# Patient Record
Sex: FEMALE | Race: WHITE | NOT HISPANIC OR LATINO | Employment: FULL TIME | URBAN - METROPOLITAN AREA
[De-identification: names, ages, dates, MRNs, and addresses within clinical notes are randomized per-mention and may not be internally consistent; named-entity substitution may affect disease eponyms.]

---

## 2023-10-03 ENCOUNTER — LAB (OUTPATIENT)
Dept: LAB | Facility: LAB | Age: 43
End: 2023-10-03
Payer: COMMERCIAL

## 2023-10-03 DIAGNOSIS — Z00.00 ENCOUNTER FOR GENERAL ADULT MEDICAL EXAMINATION WITHOUT ABNORMAL FINDINGS: Primary | ICD-10-CM

## 2023-10-03 LAB
AMPHETAMINES UR QL SCN: NORMAL
BARBITURATES UR QL SCN: NORMAL
BENZODIAZ UR QL SCN: NORMAL
BZE UR QL SCN: NORMAL
CANNABINOIDS UR QL SCN: NORMAL
FENTANYL+NORFENTANYL UR QL SCN: NORMAL
OPIATES UR QL SCN: NORMAL
OXYCODONE+OXYMORPHONE UR QL SCN: NORMAL
PCP UR QL SCN: NORMAL

## 2023-10-06 LAB
1OH-MIDAZOLAM UR CFM-MCNC: <25 NG/ML
7AMINOCLONAZEPAM UR CFM-MCNC: 170 NG/ML
A-OH ALPRAZ UR CFM-MCNC: <25 NG/ML
ALPRAZ UR CFM-MCNC: <25 NG/ML
CHLORDIAZEP UR CFM-MCNC: <25 NG/ML
CLONAZEPAM UR CFM-MCNC: <25 NG/ML
DIAZEPAM UR CFM-MCNC: <25 NG/ML
LORAZEPAM UR CFM-MCNC: <25 NG/ML
MIDAZOLAM UR CFM-MCNC: <25 NG/ML
NORDIAZEPAM UR CFM-MCNC: <25 NG/ML
OXAZEPAM UR CFM-MCNC: <25 NG/ML
TEMAZEPAM UR CFM-MCNC: <25 NG/ML

## 2023-10-11 DIAGNOSIS — F90.0 ATTENTION DEFICIT HYPERACTIVITY DISORDER (ADHD), PREDOMINANTLY INATTENTIVE TYPE: ICD-10-CM

## 2023-10-11 RX ORDER — LISDEXAMFETAMINE DIMESYLATE 30 MG/1
30 CAPSULE ORAL EVERY MORNING
Qty: 30 CAPSULE | Refills: 0 | Status: SHIPPED | OUTPATIENT
Start: 2023-10-11 | End: 2023-11-15 | Stop reason: SDUPTHER

## 2023-10-15 DIAGNOSIS — F41.0 PANIC DISORDER: ICD-10-CM

## 2023-10-16 RX ORDER — CLONAZEPAM 0.5 MG/1
0.5 TABLET, ORALLY DISINTEGRATING ORAL 2 TIMES DAILY PRN
COMMUNITY
Start: 2023-09-28 | End: 2023-10-16 | Stop reason: SDUPTHER

## 2023-10-16 RX ORDER — ARIPIPRAZOLE 2 MG/1
2 TABLET ORAL DAILY
COMMUNITY
Start: 2023-07-24 | End: 2023-12-13 | Stop reason: SDUPTHER

## 2023-10-16 RX ORDER — CITALOPRAM 40 MG/1
40 TABLET, FILM COATED ORAL DAILY
COMMUNITY
End: 2023-12-13 | Stop reason: SDUPTHER

## 2023-10-16 RX ORDER — CLONAZEPAM 0.5 MG/1
0.5 TABLET, ORALLY DISINTEGRATING ORAL 2 TIMES DAILY PRN
Qty: 60 TABLET | Refills: 0 | Status: SHIPPED | OUTPATIENT
Start: 2023-10-16 | End: 2023-10-19 | Stop reason: ALTCHOICE

## 2023-10-16 NOTE — PROGRESS NOTES
Patient emailed requesting refill for clonazepam. She reports her last refill was only partially filled d/t lack of supply at pharmacy - requests new prescription be sent to   [facility]   [id number] [id number]    Unable to review OARRS in EPIC  Reviewed via outside system on 10/16/234  Clonazepam 0.5mg last filled on 9/28/23 for 25 tablets/12days.  30d refill placed (60 tablets)

## 2023-10-19 ENCOUNTER — TELEPHONE (OUTPATIENT)
Dept: BEHAVIORAL HEALTH | Facility: CLINIC | Age: 43
End: 2023-10-19
Payer: COMMERCIAL

## 2023-10-19 DIAGNOSIS — F41.0 PANIC DISORDER: ICD-10-CM

## 2023-10-19 RX ORDER — CLONAZEPAM 0.5 MG/1
0.5 TABLET ORAL 2 TIMES DAILY
Qty: 60 TABLET | Refills: 0 | Status: SHIPPED | OUTPATIENT
Start: 2023-10-19 | End: 2023-10-20 | Stop reason: ALTCHOICE

## 2023-10-19 NOTE — PROGRESS NOTES
Received message from patient that her requested pharmacy is out of Clonazepam disintegrating tablets - she is requesting a one time fill of oral tablets. Reviewed OARRS on 10/19/23 via [employer]. Unable to access PDMP in [facility]. Clonazepam last filled on 9/28/23 for 25 tablets (0.5mg) New order placed per patient request.

## 2023-10-20 RX ORDER — CLONAZEPAM 0.5 MG/1
0.5 TABLET ORAL 2 TIMES DAILY
Qty: 60 TABLET | Refills: 0 | Status: SHIPPED | OUTPATIENT
Start: 2023-10-20 | End: 2023-12-13 | Stop reason: DRUGHIGH

## 2023-10-20 NOTE — PROGRESS NOTES
Patient request new prescription of Clonazepam 0.5mg tablet be sent to [facility] at [id number] [place] [address] [facility] in [place]. Reviewed OARRS on 10/20/23. Clonazepam last filled on 9/28/23 for 12day supply. New order entered as requested and cancelled all unfilled orders.

## 2023-11-15 ENCOUNTER — TELEPHONE (OUTPATIENT)
Dept: BEHAVIORAL HEALTH | Facility: CLINIC | Age: 43
End: 2023-11-15
Payer: COMMERCIAL

## 2023-11-15 DIAGNOSIS — F90.0 ATTENTION DEFICIT HYPERACTIVITY DISORDER (ADHD), PREDOMINANTLY INATTENTIVE TYPE: ICD-10-CM

## 2023-11-15 RX ORDER — LISDEXAMFETAMINE DIMESYLATE 30 MG/1
30 CAPSULE ORAL EVERY MORNING
Qty: 30 CAPSULE | Refills: 0 | Status: SHIPPED | OUTPATIENT
Start: 2023-11-15 | End: 2023-12-13 | Stop reason: DRUGHIGH

## 2023-11-15 NOTE — PROGRESS NOTES
Refill requested for Vyvanse 30mg daily. OARRS reviewed on 11/15/23 - no discrepancies or concerns noted. Vyvanse last filled on 10/12/23. 30d refill placed.    None

## 2023-12-13 ENCOUNTER — TELEMEDICINE (OUTPATIENT)
Dept: BEHAVIORAL HEALTH | Facility: CLINIC | Age: 43
End: 2023-12-13
Payer: COMMERCIAL

## 2023-12-13 DIAGNOSIS — F33.40 RECURRENT MAJOR DEPRESSIVE DISORDER, IN REMISSION (CMS-HCC): ICD-10-CM

## 2023-12-13 DIAGNOSIS — F41.1 GAD (GENERALIZED ANXIETY DISORDER): ICD-10-CM

## 2023-12-13 DIAGNOSIS — F41.0 PANIC DISORDER: ICD-10-CM

## 2023-12-13 DIAGNOSIS — F90.0 ADHD, PREDOMINANTLY INATTENTIVE TYPE: ICD-10-CM

## 2023-12-13 PROCEDURE — 99213 OFFICE O/P EST LOW 20 MIN: CPT

## 2023-12-13 RX ORDER — LISDEXAMFETAMINE DIMESYLATE 40 MG/1
40 CAPSULE ORAL EVERY MORNING
Qty: 30 CAPSULE | Refills: 0 | Status: SHIPPED | OUTPATIENT
Start: 2024-01-11 | End: 2024-01-26 | Stop reason: SDUPTHER

## 2023-12-13 RX ORDER — HYDROXYZINE HYDROCHLORIDE 50 MG/1
50 TABLET, FILM COATED ORAL NIGHTLY
COMMUNITY
Start: 2023-09-27 | End: 2023-12-13 | Stop reason: SDUPTHER

## 2023-12-13 RX ORDER — LISDEXAMFETAMINE DIMESYLATE 40 MG/1
40 CAPSULE ORAL EVERY MORNING
Qty: 30 CAPSULE | Refills: 0 | Status: SHIPPED | OUTPATIENT
Start: 2023-12-13 | End: 2024-01-26 | Stop reason: WASHOUT

## 2023-12-13 RX ORDER — CLONAZEPAM 0.5 MG/1
0.5 TABLET, ORALLY DISINTEGRATING ORAL 2 TIMES DAILY PRN
Qty: 60 TABLET | Refills: 0 | Status: SHIPPED | OUTPATIENT
Start: 2024-01-11 | End: 2024-02-12 | Stop reason: WASHOUT

## 2023-12-13 RX ORDER — CITALOPRAM 40 MG/1
40 TABLET, FILM COATED ORAL DAILY
Qty: 90 TABLET | Refills: 0 | Status: SHIPPED | OUTPATIENT
Start: 2023-12-13 | End: 2024-02-12 | Stop reason: SDUPTHER

## 2023-12-13 RX ORDER — ARIPIPRAZOLE 2 MG/1
2 TABLET ORAL DAILY
Qty: 90 TABLET | Refills: 0 | Status: SHIPPED | OUTPATIENT
Start: 2023-12-13 | End: 2024-02-12 | Stop reason: SDUPTHER

## 2023-12-13 RX ORDER — HYDROXYZINE HYDROCHLORIDE 50 MG/1
50 TABLET, FILM COATED ORAL NIGHTLY
Qty: 90 TABLET | Refills: 0 | Status: SHIPPED | OUTPATIENT
Start: 2023-12-13 | End: 2024-02-12 | Stop reason: SDUPTHER

## 2023-12-13 RX ORDER — CLONAZEPAM 0.5 MG/1
0.5 TABLET, ORALLY DISINTEGRATING ORAL 2 TIMES DAILY PRN
Qty: 60 TABLET | Refills: 0 | Status: SHIPPED | OUTPATIENT
Start: 2023-12-13 | End: 2024-02-12 | Stop reason: WASHOUT

## 2023-12-13 NOTE — PROGRESS NOTES
"Subjective   Patient ID: Caro Dewitt is a 43 y.o. female who presents for Anxiety, ADHD, Depression, and Med Management Last visit with this writer on 9/27/23.     HPI  When asked how she is doing patient reports \"Overwhelmed\", \"work is crazy right now\". Mood is stable - settling into life in [facility] with recent move. Patient reports difficulty starting and finishing tasks at home and work. Feeling overwhelmed in both settings. Divorce is described as \"going well\", \"doing great\". Communicating and working with Ex as needed to care for children. Increasing stress reported as patients job may be moved to in-person but no details provided yet. Working to establish with PCP after work trip in late January.     - Depression -   Mood: \"pretty good\"   No anhedonia   Appetite: stable   Weight loss or gain: stable   Sleep: \"way better\" - using PRN hydroxyzine, 7-8 hours/night   Worthlessness/hopelessness/guilt: endorses   Concentration/Focus: impaired, impacted by anxiety and ADHD   Energy levels: + fatigue   Safety: When asked directly, patient denies any SI/HI plan or intent.   + SI recognized when taking Lexapro in the past.      - anxiety -   + excessive worrying/anxiety - \"doing okay\"   Irritability - \"little bit\"  Tense - \"all the time\"   Increased anxiety when \"socializing\"   panic attacks - endorses, at baseline, most days.   + PD     ADHD  Vyvanse 30mg - inadequate dose   Describes an inability to finish tasks and stay focused   + Easily distracted at home and work  + difficultly communicating and staying focused in meetings    Patient is low acute and low chronic risk of suicide. Static risk factors include female gender,  race and age 42. Dynamic risk factors include above psychiatric symptoms which we are addressing with medication and recent life stressors and relationship difficulties. Protective factors include no previous attempts, no drug abuse, rational thinking intact, good social support, " help seeking, no SI, hopeful, future oriented and no guns at home.     Substance use hx  ETOH - 4 drinks every two months   Tobacco - smoking, 1ppd   MJ - denies   Illicit substance use - denies   hx of treatment for CYRIL - denies   caffeine - 3 cups of coffee daily     Objective   Physical Exam    Lab Review:   No visits with results within 2 Month(s) from this visit.   Latest known visit with results is:   Lab on 10/03/2023   Component Date Value    Amphetamine Screen, Urine 10/03/2023 Presumptive Negative     Barbiturate Screen, Urine 10/03/2023 Presumptive Negative     Benzodiazepines Screen, * 10/03/2023 Presumptive Negative     Cannabinoid Screen, Urine 10/03/2023 Presumptive Negative     Cocaine Metabolite Scree* 10/03/2023 Presumptive Negative     Fentanyl Screen, Urine 10/03/2023 Presumptive Negative     Opiate Screen, Urine 10/03/2023 Presumptive Negative     Oxycodone Screen, Urine 10/03/2023 Presumptive Negative     PCP Screen, Urine 10/03/2023 Presumptive Negative     Clonazepam 10/03/2023 <25     7-Aminoclonazepam 10/03/2023 170 (H)     Alprazolam 10/03/2023 <25     Alpha-Hydroxyalprazolam 10/03/2023 <25     Midazolam 10/03/2023 <25     Alpha-Hydroxymidazolam 10/03/2023 <25     Chlordiazepoxide 10/03/2023 <25     Diazepam 10/03/2023 <25     Nordiazepam 10/03/2023 <25     Temazepam 10/03/2023 <25     Oxazepam 10/03/2023 <25     Lorazepam 10/03/2023 <25        Assessment/Plan   Problem List Items Addressed This Visit             ICD-10-CM    Panic disorder F41.0    Recurrent major depressive disorder, in remission (CMS/HCC) F33.40    CHANRDAKANT (generalized anxiety disorder) F41.1    ADHD, predominantly inattentive type F90.0   43 y/o female just moved to [place], [place] from [place].  from . + 2 kids (14 and 9).   Employed full-time as a  at [employer] in [place]. [employer]  pphx: MDD, CHANDRAKANT, panic disorder and ADHD     DSM-5 Diagnosis   MDD  CHANDRAKANT  panic disorder  ADHD - inattentive type       Current Medications   hydroxyzine 50mg once daily at bedtime  Celexa 40mg daily  Vyvanse 30mg - last filled on 11/19/23   Clonazepam disintegrating 0.5mg BID - last filled on 10/23/23.   Abilify 2mg   Drug screen completed 10/3/23  No CSA on file   Reviewed OARRS on 12/13/23, no discrepancies or concerns noted      - Mood is stable with adequate sleep.   - panic and anxiety symptoms remain present - at baseline  - ADHD symptoms remain present and impactful   - increase Vyvanse dose to 40mg daily  - c/w Celexa 40mg daily  - c/w Hydroxyzine 50mg at bedtime  - c/w Abilify 2mg at bedtime   - c/w Clonazepam 0.5mg ODT BID   - CSA sent to patient via [employer]   - Encouraged patient to communicate any questions, concerns or side effects if recognized. Advised to call [phone] to report any urgent concerns and/or schedule a follow-up in two months or sooner if needed   - Visit scheduled per patient preference  - Patient is aware this provider is leaving  March 2024, recommended to continue care with  Psychiatry.   - patient is agreeable to plan detailed above.     Start time 2:29pm   End time 2:49pm   Prep/charting time 8min   Total time 28min

## 2023-12-13 NOTE — PATIENT INSTRUCTIONS
Continue Clonazepam, Celexa, Abilify and Hydroxyzine as prescribed  Increase Vyvanse dose to 40mg daily  Follow up visit in two months or sooner if needed  Please call the office or message via [employer] with any questions or concerns

## 2024-01-26 DIAGNOSIS — F90.0 ADHD, PREDOMINANTLY INATTENTIVE TYPE: ICD-10-CM

## 2024-01-26 RX ORDER — LISDEXAMFETAMINE DIMESYLATE 40 MG/1
40 CAPSULE ORAL EVERY MORNING
Qty: 30 CAPSULE | Refills: 0 | Status: SHIPPED | OUTPATIENT
Start: 2024-01-26 | End: 2024-05-07 | Stop reason: SDUPTHER

## 2024-01-26 NOTE — PROGRESS NOTES
Reviewed OARRS on 1/26/24. No Discrepancies or concerns noted. Refill for Vyvanse 40mg daily placed - 30d supply.

## 2024-02-12 ENCOUNTER — TELEMEDICINE (OUTPATIENT)
Dept: BEHAVIORAL HEALTH | Facility: CLINIC | Age: 44
End: 2024-02-12
Payer: COMMERCIAL

## 2024-02-12 DIAGNOSIS — F33.40 RECURRENT MAJOR DEPRESSIVE DISORDER, IN REMISSION (CMS-HCC): ICD-10-CM

## 2024-02-12 DIAGNOSIS — F90.0 ADHD, PREDOMINANTLY INATTENTIVE TYPE: ICD-10-CM

## 2024-02-12 DIAGNOSIS — F41.1 GAD (GENERALIZED ANXIETY DISORDER): ICD-10-CM

## 2024-02-12 DIAGNOSIS — F41.0 PANIC DISORDER: ICD-10-CM

## 2024-02-12 PROCEDURE — 99213 OFFICE O/P EST LOW 20 MIN: CPT

## 2024-02-12 RX ORDER — ARIPIPRAZOLE 2 MG/1
2 TABLET ORAL DAILY
Qty: 90 TABLET | Refills: 0 | Status: SHIPPED | OUTPATIENT
Start: 2024-02-12

## 2024-02-12 RX ORDER — HYDROXYZINE HYDROCHLORIDE 50 MG/1
50 TABLET, FILM COATED ORAL NIGHTLY
Qty: 90 TABLET | Refills: 0 | Status: SHIPPED | OUTPATIENT
Start: 2024-02-12

## 2024-02-12 RX ORDER — CLONAZEPAM 0.5 MG/1
0.5 TABLET, ORALLY DISINTEGRATING ORAL 2 TIMES DAILY
Qty: 60 TABLET | Refills: 0 | Status: SHIPPED | OUTPATIENT
Start: 2024-04-14 | End: 2024-05-24 | Stop reason: SDUPTHER

## 2024-02-12 RX ORDER — CITALOPRAM 40 MG/1
40 TABLET, FILM COATED ORAL DAILY
Qty: 90 TABLET | Refills: 0 | Status: SHIPPED | OUTPATIENT
Start: 2024-02-12

## 2024-02-12 RX ORDER — CLONAZEPAM 0.5 MG/1
0.5 TABLET, ORALLY DISINTEGRATING ORAL 2 TIMES DAILY
Qty: 60 TABLET | Refills: 0 | Status: SHIPPED | OUTPATIENT
Start: 2024-03-16 | End: 2024-05-24 | Stop reason: SDUPTHER

## 2024-02-12 RX ORDER — CLONAZEPAM 0.5 MG/1
0.5 TABLET, ORALLY DISINTEGRATING ORAL 2 TIMES DAILY
Qty: 60 TABLET | Refills: 0 | Status: SHIPPED | OUTPATIENT
Start: 2024-02-16 | End: 2024-05-24 | Stop reason: SDUPTHER

## 2024-02-12 NOTE — PROGRESS NOTES
"Subjective   Patient ID: Caro Dewitt is a 43 y.o. female who presents for ADHD, Anxiety, Depression, Panic Attack, and Med Management. Last visit with this writer on 12/13/24.     HPI  Patient arrived on-time for virtual appointment. A/Ox3. Patient reports she is \"Doing pretty good\" but has been very busy the past 1-2 months. Large site visit at work went well and recently closed on new home in [place]. Moving in shortly which patient is looking forward to. Positive response with Vyvanse 40mg daily but trial was short-term with recent Vyvanse shortage. Working to establish with PCP in [place].  No new stressors or urgent concerns reported.     - Depression -   Mood: \"overall okay\"   - no low mood/depressed days since last visit  No anhedonia   Appetite: stable   Weight loss or gain: stable   Sleep: improving - using PRN hydroxyzine, 7-8 hours/night   Worthlessness/hopelessness: denies   + guilt, \"all the time\"   Concentration/Focus: impaired, impacted by anxiety and ADHD   Energy levels: + fatigue   Safety: When asked directly, patient denies any SI/HI plan or intent.   + SI recognized when taking Lexapro in the past.      - anxiety -  Intermittently feeling overwhelmed with external stressors  Restlessness/keyed up or on edge: endorses   Irritability: endorses, \"little bit\"   Sleep disturbance: denies   panic attacks - endorses, increasing in severity/frequency.   + h/o PD     ADHD  Vyvanse 40mg - brief trial with shortage, no side effects or ADRs at higher dose   Focus \"A little bit better\" on 40mg   Overall functioning adequately     Patient is low acute and low chronic risk of suicide. Static risk factors include female gender,  race and age 42. Dynamic risk factors include above psychiatric symptoms which we are addressing with medication and recent life stressors and relationship difficulties. Protective factors include no previous attempts, no drug abuse, rational thinking intact, good social " "support, help seeking, no SI, hopeful, future oriented and no guns at home.     Substance use hx  ETOH - 4 drinks every two months   Tobacco - smoking, 1ppd   MJ - denies   Illicit substance use - denies   hx of treatment for CYRIL - denies   caffeine - 3 cups of coffee daily     Objective   Physical Exam  Constitutional:       Appearance: Normal appearance.   Neurological:      Mental Status: She is alert and oriented to person, place, and time.       General Appearance: Well groomed, appropriate eye contact  Attitude/Behavior: Cooperative  Motor: No psychomotor agitation or retardation, no tremor or other abnormal movements  Speech: Normal rate, volume, prosody  Gait/Station: WFL - Within functional limits  Mood: \"overall okay\"  Affect: Euthymic, full-range  Thought Process: Linear, goal directed  Thought Associations: No loosening of associations  Thought Content: Normal  Perception: No perceptual abnormalities noted  Sensorium: Alert and oriented to person, place, time and situation  Insight: Intact  Judgement: Intact  Cognition: Cognitively intact to conversational testing with respect to attention, orientation, fund of knowledge, recent and remote memory, and language    Lab Review:   No visits with results within 2 Month(s) from this visit.   Latest known visit with results is:   Lab on 10/03/2023   Component Date Value    Amphetamine Screen, Urine 10/03/2023 Presumptive Negative     Barbiturate Screen, Urine 10/03/2023 Presumptive Negative     Benzodiazepines Screen, * 10/03/2023 Presumptive Negative     Cannabinoid Screen, Urine 10/03/2023 Presumptive Negative     Cocaine Metabolite Scree* 10/03/2023 Presumptive Negative     Fentanyl Screen, Urine 10/03/2023 Presumptive Negative     Opiate Screen, Urine 10/03/2023 Presumptive Negative     Oxycodone Screen, Urine 10/03/2023 Presumptive Negative     PCP Screen, Urine 10/03/2023 Presumptive Negative     Clonazepam 10/03/2023 <25     7-Aminoclonazepam 10/03/2023 " 170 (H)     Alprazolam 10/03/2023 <25     Alpha-Hydroxyalprazolam 10/03/2023 <25     Midazolam 10/03/2023 <25     Alpha-Hydroxymidazolam 10/03/2023 <25     Chlordiazepoxide 10/03/2023 <25     Diazepam 10/03/2023 <25     Nordiazepam 10/03/2023 <25     Temazepam 10/03/2023 <25     Oxazepam 10/03/2023 <25     Lorazepam 10/03/2023 <25        Assessment/Plan   Problem List Items Addressed This Visit             ICD-10-CM    Panic disorder F41.0    Recurrent major depressive disorder, in remission (CMS/HCC) F33.40    CHANDRAKANT (generalized anxiety disorder) F41.1    ADHD, predominantly inattentive type F90.0   43 y/o female with a hx of unknown autoimmune d/o domiciled with two children in [place], [place]. + 2 kids (14 and 9). Moved to [place] from [place] in 2023. Recently  from . Employed full-time as a  at [employer] in [place]. Initial visit with his writer on 9/14/23. Records from former provider in [place] obtained and in chart.   pphx: MDD, CHANDRAKANT, panic disorder and ADHD     DSM-5 Diagnosis   MDD  CHANDRAKANT  panic disorder  ADHD - inattentive type      Current Medications   hydroxyzine 50mg once daily at bedtime  Celexa 40mg daily  Vyvanse 40mg - last filled on 12/13/24.   Clonazepam disintegrating 0.5mg BID - last filled on 1/18/24 for 60 tablets   Abilify 2mg   Drug screen completed 10/3/23  No CSA on file   Reviewed OARRS on 2/12/24, no discrepancies or concerns noted      - Mood is stable with improving sleep. No SI/HI   - panic and anxiety symptoms remain present   - ADHD symptoms present but subtly improving with higher dose of stimulant. Overall functioning adequately   - c/w Vyvanse dose to 40mg daily  - c/w Celexa 40mg daily  - c/w Hydroxyzine 50mg at bedtime  - c/w Abilify 2mg at bedtime   - c/w Clonazepam 0.5mg ODT BID   - CSA sent to patient via [employer]   - No refill provided for Vyvanse as patient is in the process of locating supply at local pharmacy. She agrees to message via [employer]  if/when she locates preferred pharmacy for Vyvanse. All other refills sent to listed pharmacy   - Encouraged patient to communicate any questions, concerns or side effects if recognized.   - Patient is aware this provider is leaving  March 2024, will schedule with Norm Shah at [facility]   - patient is agreeable to plan detailed above.     Start time 2:03pm  End time 2:18pm   Prep/charting time 5min  Total time 20min

## 2024-02-12 NOTE — PATIENT INSTRUCTIONS
Continue Clonazepam 0.5mg disintegrating tablet, twice per day  Continue Hydroxyzine 50mg tablet at bedtime   Continue Vyvanse 40mg, once daily in the morning   Continue Celexa 40mg daily  Continue Abilify 2mg daily

## 2024-04-03 ENCOUNTER — OFFICE VISIT (OUTPATIENT)
Dept: OBSTETRICS AND GYNECOLOGY | Facility: CLINIC | Age: 44
End: 2024-04-03
Payer: COMMERCIAL

## 2024-04-03 VITALS
WEIGHT: 157 LBS | SYSTOLIC BLOOD PRESSURE: 122 MMHG | BODY MASS INDEX: 26.16 KG/M2 | DIASTOLIC BLOOD PRESSURE: 72 MMHG | HEIGHT: 65 IN

## 2024-04-03 DIAGNOSIS — Z01.419 WELL WOMAN EXAM WITH ROUTINE GYNECOLOGICAL EXAM: ICD-10-CM

## 2024-04-03 DIAGNOSIS — N84.1 CERVICAL POLYP: Primary | ICD-10-CM

## 2024-04-03 DIAGNOSIS — Z30.09 CONTRACEPTIVE EDUCATION: ICD-10-CM

## 2024-04-03 DIAGNOSIS — Z12.31 VISIT FOR SCREENING MAMMOGRAM: ICD-10-CM

## 2024-04-03 PROCEDURE — 99386 PREV VISIT NEW AGE 40-64: CPT | Performed by: ADVANCED PRACTICE MIDWIFE

## 2024-04-03 PROCEDURE — 88175 CYTOPATH C/V AUTO FLUID REDO: CPT

## 2024-04-03 PROCEDURE — 87624 HPV HI-RISK TYP POOLED RSLT: CPT

## 2024-04-03 PROCEDURE — 88141 CYTOPATH C/V INTERPRET: CPT | Performed by: STUDENT IN AN ORGANIZED HEALTH CARE EDUCATION/TRAINING PROGRAM

## 2024-04-03 RX ORDER — SPIRONOLACTONE 100 MG/1
100 TABLET, FILM COATED ORAL DAILY
COMMUNITY

## 2024-04-03 NOTE — PROGRESS NOTES
"Subjective   Caro Dewitt is a 43 y.o. female new patient who is here for a routine annual exam.     Current contraception: none- desires to discuss today.   Last Pap: Believes it was done 3 years ago while living in [place].   History of abnormal Pap smear: no  Last mammogram: None.   History of abnormal mammogram: no  PCP: Has appt next week to establish care with a new/local provider.   STD Screening: Declines   UTI S/S: Denies   Unusual Vaginal Discharge: Yamileth     Moved back to [place] from [place], [place] in June 2023.   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   Has two girls, aged 15 and 10.     Reports history of cervical and endometrial polyps in the past. Reports she then went for follow-up and a different provider said the polyps had resolved. See assessment and plan below.     Most recently was using condoms for contraception, though is interested in a hormonal method. In the past, used Ortho Tri Cyclen Lo, Nuva Ring (reports it frequently fell out) and Depo (made pt feel \"wacky\"). Note that pt is 43 years old and smokes.       Review of Systems    Objective   /72 (BP Location: Right arm, Patient Position: Sitting, BP Cuff Size: Adult)   Ht 1.651 m (5' 5\")   Wt 71.2 kg (157 lb)   LMP 03/19/2024   Breastfeeding No   BMI 26.13 kg/m²     Physical Exam  Constitutional:       Appearance: Normal appearance.   Genitourinary:      Vulva, bladder and urethral meatus normal.      Genitourinary Comments: A small, non friable cervical polyp is noted. The base of the polyp appears wide and extends into the os.         Right Adnexa: not tender.     Left Adnexa: not tender.     Cervical polyp present.      Uterus is not tender.   Breasts:     Right: Normal.      Left: Normal.   HENT:      Head: Normocephalic.   Pulmonary:      Effort: Pulmonary effort is normal.   Abdominal:      Palpations: Abdomen is soft. There is no mass.      Tenderness: There is no abdominal tenderness. There is no " right CVA tenderness or left CVA tenderness.   Musculoskeletal:         General: Normal range of motion.   Neurological:      General: No focal deficit present.      Mental Status: She is alert and oriented to person, place, and time.   Psychiatric:         Mood and Affect: Mood normal.         Behavior: Behavior normal.         Thought Content: Thought content normal.         Judgment: Judgment normal.   Vitals and nursing note reviewed.          Assessment/Plan   Reviewed recommendations regarding frequency of pap screening.   Pap done today.   Discussed all available hormonal contraceptive options, especially given age and smoking history. Reviewed risks, benefits, use, potential side effects of all progesterone only methods, as well as ParaGard. Following discussion, pt desires Mirena IUD. Reviewed insertion and removal processes, insertion timing (suggested while on menses), method specific risks, benefits, side effects, menstrual changes/bleeding. Pt voiced understanding.   Discussed speculum exam findings and presence of cervical polyp. Discussed option for removal, which pt desires. As the base of the polyp appears wide and extends into the cervical os, suggested removal per physician. Appt scheduled. Discussed removal prior to Mirena insertion. Pt in agreement.   Mammogram order placed; reviewed instructions for testing.   No other questions or concerns.

## 2024-04-09 ENCOUNTER — OFFICE VISIT (OUTPATIENT)
Dept: PRIMARY CARE | Facility: CLINIC | Age: 44
End: 2024-04-09
Payer: COMMERCIAL

## 2024-04-09 VITALS
HEIGHT: 65 IN | SYSTOLIC BLOOD PRESSURE: 112 MMHG | WEIGHT: 156 LBS | OXYGEN SATURATION: 98 % | HEART RATE: 88 BPM | TEMPERATURE: 98.6 F | BODY MASS INDEX: 25.99 KG/M2 | RESPIRATION RATE: 16 BRPM | DIASTOLIC BLOOD PRESSURE: 77 MMHG

## 2024-04-09 DIAGNOSIS — R46.89 COGNITIVE AND BEHAVIORAL CHANGES: ICD-10-CM

## 2024-04-09 DIAGNOSIS — F90.0 ADHD, PREDOMINANTLY INATTENTIVE TYPE: ICD-10-CM

## 2024-04-09 DIAGNOSIS — F41.0 PANIC DISORDER: ICD-10-CM

## 2024-04-09 DIAGNOSIS — Z12.83 SCREENING FOR SKIN CANCER: ICD-10-CM

## 2024-04-09 DIAGNOSIS — R53.82 CHRONIC FATIGUE: ICD-10-CM

## 2024-04-09 DIAGNOSIS — R41.89 COGNITIVE AND BEHAVIORAL CHANGES: ICD-10-CM

## 2024-04-09 DIAGNOSIS — F33.40 RECURRENT MAJOR DEPRESSIVE DISORDER, IN REMISSION (CMS-HCC): Primary | ICD-10-CM

## 2024-04-09 DIAGNOSIS — E01.0 THYROMEGALY: ICD-10-CM

## 2024-04-09 PROCEDURE — 99204 OFFICE O/P NEW MOD 45 MIN: CPT | Performed by: NURSE PRACTITIONER

## 2024-04-09 SDOH — HEALTH STABILITY: MENTAL HEALTH: MEAL TIME: 0

## 2024-04-09 SDOH — HEALTH STABILITY: MENTAL HEALTH: DECREASE IN PERCEIVED RISK: 0

## 2024-04-09 SDOH — HEALTH STABILITY: MENTAL HEALTH: PERCEIVED MEDIA MESSAGE ABOUT SMOKING: 0

## 2024-04-09 SDOH — HEALTH STABILITY: MENTAL HEALTH: DRINKING ALCOHOL: 0

## 2024-04-09 SDOH — HEALTH STABILITY: MENTAL HEALTH: CONTACT WITH SUBSTANCE: 0

## 2024-04-09 SDOH — HEALTH STABILITY: MENTAL HEALTH: COMPANY OF SMOKERS: 0

## 2024-04-09 SDOH — HEALTH STABILITY: MENTAL HEALTH: DRINKING COFFEE: 0

## 2024-04-09 SDOH — HEALTH STABILITY: MENTAL HEALTH: DISRUPTIVE BEHAVIOR: 0

## 2024-04-09 SDOH — HEALTH STABILITY: MENTAL HEALTH: DRIVING: 0

## 2024-04-09 ASSESSMENT — ENCOUNTER SYMPTOMS
COMPULSIONS: 0
CONFUSION: 1
FEELING OF CHOKING: 0
UNEXPECTED WEIGHT CHANGE: 0
THOUGHT CONTENT - OBSESSIONS: 0
DEPRESSED MOOD: 1
WEAKNESS: 0
STRESS: 0
NUMBNESS: 0
DIZZINESS: 0
TREMORS: 0
APPETITE CHANGE: 0
SORE THROAT: 0
PALPITATIONS: 0
PANIC: 1
DECREASED CONCENTRATION: 1
EYE PAIN: 0
ACTIVITY CHANGE: 0
LIGHT-HEADEDNESS: 0
NERVOUS/ANXIOUS: 1
COUGH: 1
ARTHRALGIAS: 0
IRRITABILITY: 0
FACIAL ASYMMETRY: 0
SEIZURES: 0
JOINT SWELLING: 0
NAUSEA: 0
FATIGUE: 0
EYE REDNESS: 0
COLOR CHANGE: 0
PHOTOPHOBIA: 0
DIAPHORESIS: 0
SHORTNESS OF BREATH: 0
SPEECH DIFFICULTY: 0
RESTLESSNESS: 0
FEVER: 0
WOUND: 0
INSOMNIA: 1
BACK PAIN: 0
NECK STIFFNESS: 0
HYPERVENTILATION: 0
EYE DISCHARGE: 0
EYE ITCHING: 0
CHILLS: 0
HEADACHES: 0

## 2024-04-09 ASSESSMENT — LIFESTYLE VARIABLES: CRAVINGS: 0

## 2024-04-09 NOTE — ASSESSMENT & PLAN NOTE
Patient has panic attacks if she is driving on highway- so she avoids driving on the highway this came  later.  Head feels funny and foggy when she turns her eyes they dont focus right- then she has panic attacks-worried about situations where she could pass out and hurt other people or herself.   If this happens she will take clonazepam before leaving her house. Has coping mechanisms and tools in her belt.   Discussed rapid eye movement therapy, cognitive behavioral therapy as options for improving preventing these.

## 2024-04-09 NOTE — PROGRESS NOTES
Subjective   Caro Dewitt is a 43 y.o. female who presents for Establish Care.  Patient stated moved to [place], grew up here,  and her are , from [place] last June and would like to Establish Care.     Her pyschiatrist Low Mccarty at  is seeing her.  Patient Active Problem List:     Panic disorder     Recurrent major depressive disorder, in remission (CMS/HCC)     CHANDRAKANT (generalized anxiety disorder)     ADHD, predominantly inattentive type    Current Outpatient Medications:   ·  ARIPiprazole (Abilify) 2 mg tablet, Take 1 tablet (2 mg) by mouth once daily., Disp: 90 tablet, Rfl: 0  ·  citalopram (CeleXA) 40 mg tablet, Take 1 tablet (40 mg) by mouth once daily., Disp: 90 tablet, Rfl: 0  ·  clonazePAM (KlonoPIN) 0.5 mg disintegrating tablet, Take 1 tablet (0.5 mg) by mouth 2 times a day.   ·  hydrOXYzine HCL (Atarax) 50 mg tablet, Take 1 tablet (50 mg) by mouth once daily at bedtime., Disp: 90 tablet, Rfl: 0  ·  lisdexamfetamine (Vyvanse) 40 mg capsule, Take 1 capsule (40 mg) by mouth once daily in the morning., Disp: 30 capsule, Rfl: 0  ·  spironolactone (Aldactone) 100 mg tablet, Take 1 tablet (100 mg) by mouth once daily., Disp: , Rfl:     Patient reports she is chronically fatigued.   Has been told she has autoimmune disorder by two rheumatologist- has a lot of pain in her neck, spine, muscle spasms chondroid  process. Has had MS, FM work up and nobody knows what's wrong but chronic pain. Cymbalta at the max dose did not touch her pain.     Her girls are 15 and 10 her fatigue and pain in her joints started after her first child.   Just bought a house loves interior design as soon has her house is the way she likes it and then she will stop.   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     A little left over cough, old.  Saw OBGYN last week for routine care.     Anxiety  Presents for follow-up visit. Symptoms include chest pain (tightness feeling), confusion,  decreased concentration, depressed mood, excessive worry, insomnia, nervous/anxious behavior and panic. Patient reports no compulsions, dizziness, dry mouth, feeling of choking, hyperventilation, impotence, irritability, nausea, obsessions, palpitations, restlessness, shortness of breath or suicidal ideas. Symptoms occur most days. Duration: years.       Nicotine Dependence  Presents for initial visit. Symptoms include decreased concentration and insomnia. Symptoms are negative for cravings, fatigue, headache, irritability and sore throat. Preferred tobacco types include cigarettes. Preferred cigarette types include filtered. Preferred strength is regular and light. Preferred cigarettes are non-menthol. Preferred brands include Terry. Risk factors do not include company of smokers, contact with substance, decrease in perceived risk, disruptive behavior, drinking alcohol, drinking coffee, driving, meal time, perceived media message about smoking or stress.She smokes 1 pack of cigarettes per day. She started smoking when she was >17 years old. Past treatments include nicotine patch and nicotine gum (cold turkey when had kids). The treatment provided minimal relief. Compliance with prior treatments has been variable. Caro is thinking about quitting. Caro has tried to quit 3 times. There is no history of alcohol abuse and drug use.     Review of Systems   Constitutional:  Negative for activity change, appetite change, chills, diaphoresis, fatigue, fever, irritability and unexpected weight change.   HENT:  Negative for sore throat.    Eyes:  Positive for visual disturbance (soreness in the back of her eyes, droopy on the right side of her face when she is tired.). Negative for photophobia, pain, discharge, redness and itching.   Respiratory:  Positive for cough. Negative for shortness of breath.    Cardiovascular:  Positive for chest pain (tightness feeling). Negative for palpitations.   Gastrointestinal:   Negative for nausea.   Genitourinary:  Negative for impotence.   Musculoskeletal:  Negative for arthralgias, back pain, gait problem, joint swelling and neck stiffness.        Radiating pain down her arms, pins and needles,right achilles tendon   Unable to relax her body constant muscle tension.   Shoulders are always elevated- electric shock like sensation on her legs- that has been about 8 years.    Skin:  Negative for color change, pallor, rash and wound.        Idiopathic hives and angioedema started 15 years ago and resolved in the last 8 months. Hands feet and face would swell- physical urticaria- heat pressure cold. Benadryl just made her sleep.    Neurological:  Negative for dizziness, tremors, seizures, syncope, facial asymmetry, speech difficulty, weakness, light-headedness, numbness and headaches.        Feels like she has significant cognitive decline- feels like she is getting lost in conversation. She is very much struggling with.    Psychiatric/Behavioral:  Positive for confusion and decreased concentration. Negative for suicidal ideas. The patient is nervous/anxious and has insomnia.    All other systems reviewed and are negative.      Objective   Physical Exam  Vitals reviewed.   HENT:      Head: Normocephalic.   Neck:      Thyroid: Thyromegaly (left side> right) present.   Cardiovascular:      Rate and Rhythm: Normal rate and regular rhythm.      Pulses: Normal pulses.      Heart sounds: Normal heart sounds.   Pulmonary:      Effort: Pulmonary effort is normal.      Breath sounds: Normal breath sounds.   Neurological:      General: No focal deficit present.      Mental Status: She is alert and oriented to person, place, and time. Mental status is at baseline.      Cranial Nerves: No cranial nerve deficit.      Sensory: No sensory deficit.      Motor: No weakness.      Coordination: Coordination normal.      Gait: Gait normal.      Deep Tendon Reflexes: Reflexes normal.   Psychiatric:          "Attention and Perception: She is attentive. She does not perceive auditory or visual hallucinations.         Mood and Affect: Mood is depressed. Mood is not elated. Affect is tearful. Affect is not labile, blunt, flat, angry or inappropriate.         Speech: Speech normal.         Behavior: Behavior normal.         Thought Content: Thought content normal.         Cognition and Memory: Cognition is not impaired. Memory is not impaired. She does not exhibit impaired recent memory or impaired remote memory.         Judgment: Judgment is not impulsive or inappropriate.       /77 (BP Location: Left arm, Patient Position: Sitting)   Pulse 88   Temp 37 °C (98.6 °F)   Resp 16   Ht 1.651 m (5' 5\")   Wt 70.8 kg (156 lb)   LMP 03/19/2024   SpO2 98%   BMI 25.96 kg/m²       Assessment/Plan   Problem List Items Addressed This Visit       Panic disorder     Patient has panic attacks if she is driving on highway- so she avoids driving on the highway this came  later.  Head feels funny and foggy when she turns her eyes they dont focus right- then she has panic attacks-worried about situations where she could pass out and hurt other people or herself.   If this happens she will take clonazepam before leaving her house. Has coping mechanisms and tools in her belt.          Recurrent major depressive disorder, in remission (CMS/HCC) - Primary     Depressed still- doesn't feel like she has ever been in remission         Relevant Orders    Vitamin D 25-Hydroxy,Total (for eval of Vitamin D levels)    Vitamin B12    TSH with reflex to Free T4 if abnormal    Lipid Panel    ADHD, predominantly inattentive type     Vyvanse 40mg           Other Visit Diagnoses       Screening for skin cancer        Relevant Orders    Referral to Dermatology    Chronic fatigue        Relevant Orders    Comprehensive Metabolic Panel    CBC and Auto Differential    Hemoglobin A1C    Rheumatoid Factor    Sedimentation Rate    HIV 1/2 Antigen/Antibody " Screen with Reflex to Confirmation    C-Reactive Protein    Cortisol AM    Iron and TIBC    Estrogens, Total    FSH & LH    C-reactive protein    High sensitivity CRP    VONNIE with Reflex to NURIA    Cognitive and behavioral changes        Relevant Orders    Referral to Neurology

## 2024-04-11 NOTE — PATIENT INSTRUCTIONS
Since I don't have any labs to reference I did suggest getting these completed. Neurology referral ordered due to cognitive changes.   We may need to adjust or change medications if symptoms continue.

## 2024-04-12 LAB
CYTOLOGY CMNT CVX/VAG CYTO-IMP: NORMAL
HPV HR 12 DNA GENITAL QL NAA+PROBE: POSITIVE
HPV HR GENOTYPES PNL CVX NAA+PROBE: POSITIVE
HPV16 DNA SPEC QL NAA+PROBE: NEGATIVE
HPV18 DNA SPEC QL NAA+PROBE: NEGATIVE
LAB AP HPV GENOTYPE QUESTION: YES
LAB AP HPV HR: NORMAL
LABORATORY COMMENT REPORT: NORMAL
PATH REPORT.TOTAL CANCER: NORMAL

## 2024-04-15 ENCOUNTER — TELEPHONE (OUTPATIENT)
Dept: OBSTETRICS AND GYNECOLOGY | Facility: CLINIC | Age: 44
End: 2024-04-15
Payer: COMMERCIAL

## 2024-04-15 NOTE — TELEPHONE ENCOUNTER
----- Message from ROXY Murdock sent at 4/12/2024  4:59 PM EDT -----  Please advise Caro that her pap was ASCUS, + other high risk HPV. Recommendation is for a Colpo, as soon as possible. Please ensure that pt is scheduled.     I called the patient and left a voicemail. I will also send her a My Chart message.     AGUSTO IbrahimA

## 2024-04-18 ENCOUNTER — HOSPITAL ENCOUNTER (OUTPATIENT)
Dept: RADIOLOGY | Facility: CLINIC | Age: 44
Discharge: HOME | End: 2024-04-18
Payer: COMMERCIAL

## 2024-04-18 ENCOUNTER — LAB (OUTPATIENT)
Dept: LAB | Facility: LAB | Age: 44
End: 2024-04-18
Payer: COMMERCIAL

## 2024-04-18 VITALS — BODY MASS INDEX: 25.83 KG/M2 | WEIGHT: 155 LBS | HEIGHT: 65 IN

## 2024-04-18 DIAGNOSIS — R53.82 CHRONIC FATIGUE: ICD-10-CM

## 2024-04-18 DIAGNOSIS — Z12.31 VISIT FOR SCREENING MAMMOGRAM: ICD-10-CM

## 2024-04-18 DIAGNOSIS — F33.40 RECURRENT MAJOR DEPRESSIVE DISORDER, IN REMISSION (CMS-HCC): ICD-10-CM

## 2024-04-18 DIAGNOSIS — E01.0 THYROMEGALY: ICD-10-CM

## 2024-04-18 LAB
25(OH)D3 SERPL-MCNC: 24 NG/ML (ref 30–100)
ALBUMIN SERPL BCP-MCNC: 4.4 G/DL (ref 3.4–5)
ALP SERPL-CCNC: 85 U/L (ref 33–110)
ALT SERPL W P-5'-P-CCNC: 10 U/L (ref 7–45)
ANION GAP SERPL CALC-SCNC: 10 MMOL/L (ref 10–20)
AST SERPL W P-5'-P-CCNC: 12 U/L (ref 9–39)
BASOPHILS # BLD AUTO: 0.02 X10*3/UL (ref 0–0.1)
BASOPHILS NFR BLD AUTO: 0.2 %
BILIRUB SERPL-MCNC: 0.3 MG/DL (ref 0–1.2)
BUN SERPL-MCNC: 16 MG/DL (ref 6–23)
CALCIUM SERPL-MCNC: 10 MG/DL (ref 8.6–10.3)
CHLORIDE SERPL-SCNC: 102 MMOL/L (ref 98–107)
CHOLEST SERPL-MCNC: 230 MG/DL (ref 0–199)
CHOLESTEROL/HDL RATIO: 4.6
CO2 SERPL-SCNC: 31 MMOL/L (ref 21–32)
CORTIS AM PEAK SERPL-MSCNC: 21.5 UG/DL (ref 5–20)
CREAT SERPL-MCNC: 0.93 MG/DL (ref 0.5–1.05)
CRP SERPL HS-MCNC: 1.7 MG/L
CRP SERPL-MCNC: 0.2 MG/DL
EGFRCR SERPLBLD CKD-EPI 2021: 78 ML/MIN/1.73M*2
EOSINOPHIL # BLD AUTO: 0.14 X10*3/UL (ref 0–0.7)
EOSINOPHIL NFR BLD AUTO: 1.6 %
ERYTHROCYTE [DISTWIDTH] IN BLOOD BY AUTOMATED COUNT: 13 % (ref 11.5–14.5)
ERYTHROCYTE [SEDIMENTATION RATE] IN BLOOD BY WESTERGREN METHOD: 10 MM/H (ref 0–20)
EST. AVERAGE GLUCOSE BLD GHB EST-MCNC: 117 MG/DL
FSH SERPL-ACNC: 16 IU/L
GLUCOSE SERPL-MCNC: 112 MG/DL (ref 74–99)
HBA1C MFR BLD: 5.7 %
HCT VFR BLD AUTO: 42.9 % (ref 36–46)
HDLC SERPL-MCNC: 49.6 MG/DL
HGB BLD-MCNC: 14.3 G/DL (ref 12–16)
HIV 1+2 AB+HIV1 P24 AG SERPL QL IA: NONREACTIVE
IMM GRANULOCYTES # BLD AUTO: 0.02 X10*3/UL (ref 0–0.7)
IMM GRANULOCYTES NFR BLD AUTO: 0.2 % (ref 0–0.9)
IRON SATN MFR SERPL: 15 % (ref 25–45)
IRON SERPL-MCNC: 55 UG/DL (ref 35–150)
LDLC SERPL CALC-MCNC: 145 MG/DL
LH SERPL-ACNC: 9 IU/L
LYMPHOCYTES # BLD AUTO: 2.54 X10*3/UL (ref 1.2–4.8)
LYMPHOCYTES NFR BLD AUTO: 28.7 %
MCH RBC QN AUTO: 28.4 PG (ref 26–34)
MCHC RBC AUTO-ENTMCNC: 33.3 G/DL (ref 32–36)
MCV RBC AUTO: 85 FL (ref 80–100)
MONOCYTES # BLD AUTO: 0.67 X10*3/UL (ref 0.1–1)
MONOCYTES NFR BLD AUTO: 7.6 %
NEUTROPHILS # BLD AUTO: 5.47 X10*3/UL (ref 1.2–7.7)
NEUTROPHILS NFR BLD AUTO: 61.7 %
NON HDL CHOLESTEROL: 180 MG/DL (ref 0–149)
NRBC BLD-RTO: 0 /100 WBCS (ref 0–0)
PLATELET # BLD AUTO: 381 X10*3/UL (ref 150–450)
POTASSIUM SERPL-SCNC: 4.5 MMOL/L (ref 3.5–5.3)
PROT SERPL-MCNC: 6.9 G/DL (ref 6.4–8.2)
RBC # BLD AUTO: 5.04 X10*6/UL (ref 4–5.2)
RHEUMATOID FACT SER NEPH-ACNC: <10 IU/ML (ref 0–15)
SODIUM SERPL-SCNC: 138 MMOL/L (ref 136–145)
THYROPEROXIDASE AB SERPL-ACNC: 49 IU/ML
TIBC SERPL-MCNC: 377 UG/DL (ref 240–445)
TRIGL SERPL-MCNC: 178 MG/DL (ref 0–149)
TSH SERPL-ACNC: 2.74 MIU/L (ref 0.44–3.98)
UIBC SERPL-MCNC: 322 UG/DL (ref 110–370)
VIT B12 SERPL-MCNC: 192 PG/ML (ref 211–911)
VLDL: 36 MG/DL (ref 0–40)
WBC # BLD AUTO: 8.9 X10*3/UL (ref 4.4–11.3)

## 2024-04-18 PROCEDURE — 83002 ASSAY OF GONADOTROPIN (LH): CPT

## 2024-04-18 PROCEDURE — 36415 COLL VENOUS BLD VENIPUNCTURE: CPT

## 2024-04-18 PROCEDURE — 82306 VITAMIN D 25 HYDROXY: CPT

## 2024-04-18 PROCEDURE — 80061 LIPID PANEL: CPT

## 2024-04-18 PROCEDURE — 83001 ASSAY OF GONADOTROPIN (FSH): CPT

## 2024-04-18 PROCEDURE — 84443 ASSAY THYROID STIM HORMONE: CPT

## 2024-04-18 PROCEDURE — 85652 RBC SED RATE AUTOMATED: CPT

## 2024-04-18 PROCEDURE — 82533 TOTAL CORTISOL: CPT

## 2024-04-18 PROCEDURE — 77067 SCR MAMMO BI INCL CAD: CPT

## 2024-04-18 PROCEDURE — 83550 IRON BINDING TEST: CPT

## 2024-04-18 PROCEDURE — 83540 ASSAY OF IRON: CPT

## 2024-04-18 PROCEDURE — 87389 HIV-1 AG W/HIV-1&-2 AB AG IA: CPT

## 2024-04-18 PROCEDURE — 85025 COMPLETE CBC W/AUTO DIFF WBC: CPT

## 2024-04-18 PROCEDURE — 86376 MICROSOMAL ANTIBODY EACH: CPT

## 2024-04-18 PROCEDURE — 82672 ASSAY OF ESTROGEN: CPT

## 2024-04-18 PROCEDURE — 86038 ANTINUCLEAR ANTIBODIES: CPT

## 2024-04-18 PROCEDURE — 86431 RHEUMATOID FACTOR QUANT: CPT

## 2024-04-18 PROCEDURE — 83036 HEMOGLOBIN GLYCOSYLATED A1C: CPT

## 2024-04-18 PROCEDURE — 77067 SCR MAMMO BI INCL CAD: CPT | Performed by: RADIOLOGY

## 2024-04-18 PROCEDURE — 86140 C-REACTIVE PROTEIN: CPT

## 2024-04-18 PROCEDURE — 82607 VITAMIN B-12: CPT

## 2024-04-18 PROCEDURE — 80053 COMPREHEN METABOLIC PANEL: CPT

## 2024-04-18 PROCEDURE — 77063 BREAST TOMOSYNTHESIS BI: CPT | Performed by: RADIOLOGY

## 2024-04-18 PROCEDURE — 86141 C-REACTIVE PROTEIN HS: CPT

## 2024-04-19 ENCOUNTER — TELEPHONE (OUTPATIENT)
Dept: OBSTETRICS AND GYNECOLOGY | Facility: CLINIC | Age: 44
End: 2024-04-19
Payer: COMMERCIAL

## 2024-04-19 LAB — ANA SER QL HEP2 SUBST: NEGATIVE

## 2024-04-22 DIAGNOSIS — E78.2 MIXED HYPERLIPIDEMIA: Primary | ICD-10-CM

## 2024-04-26 LAB — ESTROGEN SERPL-MCNC: 118 PG/ML

## 2024-04-30 ENCOUNTER — PROCEDURE VISIT (OUTPATIENT)
Dept: OBSTETRICS AND GYNECOLOGY | Facility: CLINIC | Age: 44
End: 2024-04-30
Payer: COMMERCIAL

## 2024-04-30 VITALS — BODY MASS INDEX: 25.1 KG/M2 | SYSTOLIC BLOOD PRESSURE: 124 MMHG | WEIGHT: 152.5 LBS | DIASTOLIC BLOOD PRESSURE: 70 MMHG

## 2024-04-30 DIAGNOSIS — R87.610 ASCUS WITH POSITIVE HIGH RISK HPV CERVICAL: ICD-10-CM

## 2024-04-30 DIAGNOSIS — Z98.890 HISTORY OF CERVICAL POLYPECTOMY: ICD-10-CM

## 2024-04-30 DIAGNOSIS — Z87.42 HISTORY OF CERVICAL POLYPECTOMY: ICD-10-CM

## 2024-04-30 DIAGNOSIS — R87.810 ASCUS WITH POSITIVE HIGH RISK HPV CERVICAL: ICD-10-CM

## 2024-04-30 PROBLEM — E04.9 GOITER: Status: ACTIVE | Noted: 2024-04-30

## 2024-04-30 PROBLEM — M79.7 FIBROMYALGIA: Status: ACTIVE | Noted: 2018-01-19

## 2024-04-30 PROBLEM — I95.9 HYPOTENSION: Status: ACTIVE | Noted: 2018-01-19

## 2024-04-30 PROBLEM — N84.1 POLYP OF CERVIX: Status: ACTIVE | Noted: 2024-04-30

## 2024-04-30 LAB — PREGNANCY TEST URINE, POC: NEGATIVE

## 2024-04-30 PROCEDURE — 88305 TISSUE EXAM BY PATHOLOGIST: CPT | Performed by: PATHOLOGY

## 2024-04-30 PROCEDURE — 81025 URINE PREGNANCY TEST: CPT | Performed by: ADVANCED PRACTICE MIDWIFE

## 2024-04-30 PROCEDURE — 88305 TISSUE EXAM BY PATHOLOGIST: CPT

## 2024-04-30 PROCEDURE — 57454 BX/CURETT OF CERVIX W/SCOPE: CPT | Performed by: ADVANCED PRACTICE MIDWIFE

## 2024-04-30 PROCEDURE — 58558 HYSTEROSCOPY BIOPSY: CPT | Performed by: ADVANCED PRACTICE MIDWIFE

## 2024-04-30 PROCEDURE — 88342 IMHCHEM/IMCYTCHM 1ST ANTB: CPT | Performed by: PATHOLOGY

## 2024-04-30 PROCEDURE — 88342 IMHCHEM/IMCYTCHM 1ST ANTB: CPT

## 2024-04-30 NOTE — PROGRESS NOTES
Patient ID: Caro Dewitt is a 43 y.o. female.    Colposcopy    Date/Time: 4/30/2024 2:54 PM    Performed by: ROXY King  Authorized by: ROXY King    Procedure location: cervix and vagina    Consent:     Patient questions answered: yes      Risks and benefits of the procedure and its alternatives discussed: yes      Procedural risks discussed:  Bleeding and infection    Consent obtained:  Verbal and written    Consent given by:  Patient  Indication:     Cervical indication(s): high-risk HPV positive and ASCUS    Pre-procedure:     Prep solution(s): acetic acid      Local anesthetic:  Benzocaine spray  Procedure:     Colposcopy with: cervical biopsy and endocervical curettage      Biopsy taken: yes      # of biopsies:  2    Biopsy location(s): 5 & 11 o'clock    Cervix visibility: fully visualized      SCJ visibility: fully visualized      Lesion visualized: fully visualized      Acetowhite lesion(s): cervix      Cervical impression: low grade      Vaginal impression: normal/benign      Ferric subsulfate solution applied: yes      Tampon inserted: no    Post-procedure:     Patient tolerance of procedure:  Patient tolerated the procedure well with no immediate complications    Instructions and paperwork completed: yes      Educational handouts given: yes    Comments:      Attempted to insert Mirena IUD after colposcopy however, Monsel's solution adhered to IUD with removal of applicator. Will reschedule for IUD insertion at a later date.

## 2024-05-06 PROBLEM — R73.03 PREDIABETES: Status: ACTIVE | Noted: 2024-05-06

## 2024-05-06 PROBLEM — R76.8 ANTI-TPO ANTIBODIES PRESENT: Status: ACTIVE | Noted: 2024-05-06

## 2024-05-07 ENCOUNTER — OFFICE VISIT (OUTPATIENT)
Dept: PRIMARY CARE | Facility: CLINIC | Age: 44
End: 2024-05-07
Payer: COMMERCIAL

## 2024-05-07 VITALS
WEIGHT: 154 LBS | RESPIRATION RATE: 16 BRPM | OXYGEN SATURATION: 98 % | BODY MASS INDEX: 25.66 KG/M2 | SYSTOLIC BLOOD PRESSURE: 106 MMHG | TEMPERATURE: 97.7 F | HEART RATE: 76 BPM | DIASTOLIC BLOOD PRESSURE: 67 MMHG | HEIGHT: 65 IN

## 2024-05-07 DIAGNOSIS — E78.2 MIXED HYPERLIPIDEMIA: ICD-10-CM

## 2024-05-07 DIAGNOSIS — F90.0 ADHD, PREDOMINANTLY INATTENTIVE TYPE: ICD-10-CM

## 2024-05-07 DIAGNOSIS — R76.8 ANTI-TPO ANTIBODIES PRESENT: ICD-10-CM

## 2024-05-07 DIAGNOSIS — R73.03 PREDIABETES: Primary | ICD-10-CM

## 2024-05-07 DIAGNOSIS — N84.1 POLYP OF CERVIX: ICD-10-CM

## 2024-05-07 DIAGNOSIS — F41.0 PANIC ANXIETY SYNDROME: ICD-10-CM

## 2024-05-07 RX ORDER — METFORMIN HYDROCHLORIDE 500 MG/1
500 TABLET, EXTENDED RELEASE ORAL
Qty: 90 TABLET | Refills: 0 | Status: SHIPPED | OUTPATIENT
Start: 2024-05-07 | End: 2024-08-05

## 2024-05-07 RX ORDER — LISDEXAMFETAMINE DIMESYLATE 40 MG/1
40 CAPSULE ORAL EVERY MORNING
Qty: 30 CAPSULE | Refills: 0 | Status: SHIPPED | OUTPATIENT
Start: 2024-05-07 | End: 2024-06-06

## 2024-05-07 NOTE — PROGRESS NOTES
"Subjective   Caro Dewitt is a 43 y.o. female who presents for Follow-up (Labs done 04.18.24).  HPI  Review of Systems   All other systems reviewed and are negative.      Objective   Physical Exam  /67 (BP Location: Left arm, Patient Position: Sitting)   Pulse 76   Temp 36.5 °C (97.7 °F)   Resp 16   Ht 1.66 m (5' 5.35\")   Wt 69.9 kg (154 lb)   LMP 04/13/2024 (Exact Date)   SpO2 98%   BMI 25.35 kg/m²       Assessment/Plan   Problem List Items Addressed This Visit       Anti-TPO antibodies present    Prediabetes - Primary         "

## 2024-05-07 NOTE — PATIENT INSTRUCTIONS
Diet alone has the potential to decrease LDL by 30-40 mg/dL! Please follow a diet rich in fruits, vegetables, and lean meats and sparse in saturated fats and red meats     CDC recommends adults need 150 minutes of moderate-intensity physical activity and 2 days of muscle strengthening activity, according to the current Physical Activity Guidelines for Americans (CDC Website). Aerobic exercise for 40 minutes 3-4 times a week proves most beneficial.     The earliest you should recheck a lipid panel after starting a statin is at 4-12 weeks (1-3 months). Afterwards, you can check anywhere from 3 to 13 months depending on the patient and their situation.    If you have had a cardiac event (heart attack, stroke) we aim to get that LDL < 70 mg/d.      Diabetes Smita Pack Essentials  1. Glucose Monitor  2. Glucose Treatment (gel or glucagon for inside of cheek) found over the counter    When to use:  If blood sugar < 70 take 15 gm of dextrose every 15 minutes until blood sugar > 80 if done x2 with no respone call 911    Blood Glucose Goals  Fasting goal (no food/drink 6-8 hours):   Before Meals goal: <150  After Meals goal: <180-200  If more than 2 reads of 50 per week alert your provider    A1C Goals  Check every 3 months  A1C as low as possible with goal < 7.5% while avoiding low sugars    Monitoring Blood Glucose  Check blood sugar before eating and at bedtime  Check before breakfast one day, lunch the next and dinner to rule out highs and lows  Bring reads back to your Appointment    Signs of low glucose  Hungry, sweating, anxious, dizzy  Signs of high glucose  Increased thirst, urination, hunger, nausea, vomiting, abdominal pain, fatigue    Exercise  Do not exercise if blood sugar is >350   Tips for a healthy lifestyle  -sleep 7-8 hours a night  -walk 8,000-10,000 steps per day   -limit screen time and mindless scrolling  -cut out fast food, processed food, seed oils and added sugars  -strength train  "3x/week  -focus on stretching and mobility  -cut out toxic relationships/people/things that bring you down  -communicate your feelings  -eat 1900 calories per day (aim for 1 gram of protein per lb of body weight)  -no caffeine before food  -don't skip breakfast  -rest when your body is telling you to  -do a minimum of 10 minutes of silent breathing (meditation) in the morning  -sleep with your phone in a different room  -give more hugs, be more squishy  -tell people you care about that you love them  -express yourself in ways that make you feel good and alive     Pillars of metabolism  Sleep-light in your eyes in when it is wake time and no light at bedtime  Essential Fatty Acids- get 1000 my of EPA a day   Thyroid health- 2-3 brazilian nuts a day for selenium, adequate iodine in your diet   1-2 servings saurkraut, kimchi, kombucha or kefir for gut health to prevent leaky gut and deinflamme    Movement  Movement and exercise improves health markers  Nervous system is in charge of fat lipoylsis, mobilization, oxidation and you need epinephrine to burn fat. Shivering is an adrenaline release and can help with fat burning. Try a cold shower 1-2 times a week.   Some people overeat don't gain weight some people overeat and gain weight. The examination on people who didn't gain weight- people engaged in subtle movements \" Fidgeters\"- constant movement throughout day can burn 800-2500 calories more throughout the day. The fidgety movement helps oxidate fatty acid.   Walking many times throughout the day     Diet  Low carb diet (typically under 20-50 grams per day) is popularly referred to as ketogenic diet.  20 grams carbs per day will put most patients in ketosis.  There are two types of omega 3s: marine omega 3s (EPA and DHA) and plant omega 3s (ALA). Marine omega 3s include fish sources, especially cold-water fatty fish such as salmon, mackerel, tuna, herring, and sardines. Examples of plant omega 3s include zulay, " flax, flaxseed oil, and walnuts.    Cold Therapy  Fat is controlled by neurons and the epinephrine that they release.   White (subcutaneous fat-energy storage), brown (shoulder blader, back of neck, rich with mitochondria-takes food breaks it down and converts into energy within the cells), beige (white fat that could be brown fat). Making our cells cold allows us to build mental resilience (ice bath, cryo) adrenaline from adrenals norepinephrine from fat cells. Cold exposure allows brown fat activation and beige fat can transform to brown fat. Must shiver- if you try to remain warm the burning is stopped shivering releases succinate- increases body heat through brown fat.   Instructions: getting in and out of cold bath a few time  to induce shivering- just cold enough to be uncomfortable 60 degrees.     Monitoring  Waist Circumference: While BMI provides a general assessment of obesity, measuring waist circumference can help assess visceral fat. Individuals with excess visceral fat are at higher risk for metabolic complications. A waist circumference >40 inches (102 cm) in men and >35 inches (88 cm) in women should raise suspicion of visceral obesity    Physiology Reviewed    Pathophysiology: Appetite and metabolism are controlled by the hypothalamus. Receives input of hormones from fat cells (leptin, adiponectin, adipokines), intestines (GLP1, PYY, CCK), and stomach (ghrelin).  Leptin: Hormone produced by stomach. Binds receptors in central nervous system (CNS), including the hypothalamus and brainstem ?  activates neural pathways that decrease appetite and increase sympathetic nervous system activity and energy expenditure (J Biol Chem 2010)  Ghrelin: Hormone produced by stomach. CNS action in hypothalamus stimulates appetite, enhances use of carbs and reduces fat utilization, increases gastric motility and acid secretion (Clin Chem 2004).  GLP-1 (glucagon-like peptide-1): hormone produced in the intestinal L  cells; acts via circulation on satiety in the brain, gut motility, and insulin and glucagon secretion in the pancreatic islet (Diabetes Care 2013).      All FDA approved obesity medications are contraindicated in pregnancy.  Orlistat (Oliver, Xenical): Reversible inhibitor of gastric and pancreatic lipases. Blocks 30% of fat absorption. Brands = Oliver 60 mg tab, or Xenical 120 mg tab taken 3 times daily with fat containing meal. Side effects: steatorrhea (oil stools), flatulence with discharge, abdominal pain. Average of 4% weight loss.  Liraglutide (Saxenda): GLP-1 agonist (incretin). Works on GLP-1 receptors in brain to inhibit appetite. Also slows gastric emptying and improves beta cell function in pancreas. Start at 0.6 mg injected subQ daily and titrate up weekly (or as tolerated) to 3mg subQ once daily. Need at least 4% weight loss by 12 weeks to continue. Contraindicated if family history MEN2, or medullary thyroid cancer. Data on pancreatitis risk is still uncertain. Adverse effects: tachycardia, headache, hypoglycemia, GI upset, diarrhea. Discontinue at 16 weeks if at least 4% wt loss not achieved. (Ref: Lexicomp)  Phentermine/topiramate (Qsymia): Central appetite suppressant. About 8-9% weight loss on average. Side effects: Dry mouth, upper respiratory infection (pharyngitis), paresthesias, insomnia, palpitations, headaches, kidney stones, non-gap acidosis. Start phentermine/topiramate 3.75/23 mg capsule once daily. After 2 weeks increase to 7.5/46 mg once daily. At 12 weeks assess for at least 3% weight loss. Max dose 15/92 mg daily (max 7.5/46 mg if CrCl <50ml/min). Check Cr and electrolytes after 1 month (Ref: Lexicomp)  Naloxone/bupropion (Contrave): Central appetite suppressant. Contraindicated in uncontrolled HTN, anorexia/bulimia, seizure disorder, patient on linezolid. Side effects: nausea, vomiting, constipation, headache, “sleep disorder”. Comes in 8/90 mg tablets. Start 1 tab once daily for 1 week  then 1 tab twice daily for 1 week, then titrate up by 1 tab weekly to max 2 tabs twice daily. Discontinue at 12 weeks if at least 5% wt loss has not been achieved.  Lorcaserin: believed to activate serotonin 5-HT2C receptors ? stimulates proopiomelanocortin (POMC) neurons in arcuate nucleus of hypothalamus ? satiety and decreased food intake. Dose = Immediate release 10 mg twice daily or extended release 20 mg once daily. Discontinue at week 12 assess if at least 5% weight loss not achieved. Fewer patients respond, but those who do often have 10% weight loss. Side effects: headache, hypoglycemia, abnormal lymphocyte count (low), upper respiratory infection (pharyngitis).      References   Courtney Y, Prachi C, Susan O, John ML. Effect of intermittent cold exposure on brown fat activation, obesity, and energy homeostasis in mice. PLoS One. 2014 Jan 17;9(1):p73170. doi: 10.1371/journal.pone.2566398. PMID: 88319972; PMCID: [id number].    [email or web address]

## 2024-05-07 NOTE — PROGRESS NOTES
Subjective   Patient ID: Caro Dewitt is a 43 y.o. female who presents for Follow-up (Labs done 04.18.24).    Patient to follow up on Labs. Which demonstrated, mild vitamin D deficiency, Mild B12 Deficiency, Mixed hyperlipidemia, elevated HsCRP.   Would like to discuss family history of early CV disease and minimize risk factors for enhancing disease.     Procedure Visit on 04/30/2024  Component Date Value Ref Range Status  · Preg Test, Ur 04/30/2024 Negative  Negative Final  Lab on 04/18/2024  Component Date Value Ref Range Status  · Vitamin D, 25-Hydroxy, Total 04/18/2024 24 (L)  30 - 100 ng/mL Final  · Vitamin B12 04/18/2024 192 (L)  211 - 911 pg/mL Final  · Thyroid Stimulating Hormone 04/18/2024 2.74  0.44 - 3.98 mIU/L Final  · Cholesterol 04/18/2024 230 (H)  0 - 199 mg/dL Final         Age      Desirable   Borderline High   High     0-19 Y     0 - 169       170 - 199     >/= 200    20-24 Y     0 - 189       190 - 224     >/= 225         >24 Y     0 - 199       200 - 239     >/= 240   **All ranges are based on fasting samples. Specific   therapeutic targets will vary based on patient-specific   cardiac risk.    Pediatric guidelines reference:Pediatrics 2011, 128(S5).Adult guidelines reference: NCEP ATPIII Guidelines,KAREN 2001, 258:2486-97    Venipuncture immediately after or during the administration of Metamizole may lead to falsely low results. Testing should be performed immediately prior to Metamizole dosing.  · HDL-Cholesterol 04/18/2024 49.6  mg/dL Final     Age       Very Low   Low     Normal    High    0-19 Y    < 35      < 40     40-45     ----  20-24 Y    ----     < 40      >45      ----        >24 Y      ----     < 40     40-60      >60    · Cholesterol/HDL Ratio 04/18/2024 4.6   Final     Ref Values  Desirable  < 3.4  High Risk  > 5.0  · LDL Calculated 04/18/2024 145 (H)  <=99 mg/dL Final                               Near   Borderline      AGE      Desirable  Optimal    High     High     Very  High     0-19 Y     0 - 109     ---    110-129   >/= 130     ----    20-24 Y     0 - 119     ---    120-159   >/= 160     ----      >24 Y     0 -  99   100-129  130-159   160-189     >/=190    · VLDL 04/18/2024 36  0 - 40 mg/dL Final  · Triglycerides 04/18/2024 178 (H)  0 - 149 mg/dL Final      Age         Desirable   Borderline High   High     Very High   0 D-90 D    19 - 174         ----         ----        ----  91 D- 9 Y     0 -  74        75 -  99     >/= 100      ----    10-19 Y     0 -  89        90 - 129     >/= 130      ----    20-24 Y     0 - 114       115 - 149     >/= 150      ----         >24 Y     0 - 149       150 - 199    200- 499    >/= 500    Venipuncture immediately after or during the administration of Metamizole may lead to falsely low results. Testing should be performed immediately prior to Metamizole dosing.  · Non HDL Cholesterol 04/18/2024 180 (H)  0 - 149 mg/dL Final         Age       Desirable   Borderline High   High     Very High     0-19 Y     0 - 119       120 - 144     >/= 145    >/= 160    20-24 Y     0 - 149       150 - 189     >/= 190      ----         >24 Y    30 mg/dL above LDL Cholesterol goal    · Glucose 04/18/2024 112 (H)  74 - 99 mg/dL Final  · Sodium 04/18/2024 138  136 - 145 mmol/L Final  · Potassium 04/18/2024 4.5  3.5 - 5.3 mmol/L Final  · Chloride 04/18/2024 102  98 - 107 mmol/L Final  · Bicarbonate 04/18/2024 31  21 - 32 mmol/L Final  · Anion Gap 04/18/2024 10  10 - 20 mmol/L Final  · Urea Nitrogen 04/18/2024 16  6 - 23 mg/dL Final  · Creatinine 04/18/2024 0.93  0.50 - 1.05 mg/dL Final  · eGFR 04/18/2024 78  >60 mL/min/1.73m*2 Final   Calculations of estimated GFR are performed using the 2021 CKD-EPI Study Refit equation without the race variable for the IDMS-Traceable creatinine methods.  https://jasn.asnjournals.org/content/early/2021/09/22/ASN.6178349855  · Calcium 04/18/2024 10.0  8.6 - 10.3 mg/dL Final  · Albumin 04/18/2024 4.4  3.4 - 5.0 g/dL Final  · Alkaline  Phosphatase 04/18/2024 85  33 - 110 U/L Final  · Total Protein 04/18/2024 6.9  6.4 - 8.2 g/dL Final  · AST 04/18/2024 12  9 - 39 U/L Final  · Bilirubin, Total 04/18/2024 0.3  0.0 - 1.2 mg/dL Final  · ALT 04/18/2024 10  7 - 45 U/L Final   Patients treated with Sulfasalazine may generate falsely decreased results for ALT.  · WBC 04/18/2024 8.9  4.4 - 11.3 x10*3/uL Final  · nRBC 04/18/2024 0.0  0.0 - 0.0 /100 WBCs Final  · RBC 04/18/2024 5.04  4.00 - 5.20 x10*6/uL Final  · Hemoglobin 04/18/2024 14.3  12.0 - 16.0 g/dL Final  · Hematocrit 04/18/2024 42.9  36.0 - 46.0 % Final  · MCV 04/18/2024 85  80 - 100 fL Final  · MCH 04/18/2024 28.4  26.0 - 34.0 pg Final  · MCHC 04/18/2024 33.3  32.0 - 36.0 g/dL Final  · RDW 04/18/2024 13.0  11.5 - 14.5 % Final  · Platelets 04/18/2024 381  150 - 450 x10*3/uL Final  · Neutrophils % 04/18/2024 61.7  40.0 - 80.0 % Final  · Immature Granulocytes %, Automated 04/18/2024 0.2  0.0 - 0.9 % Final   Immature Granulocyte Count (IG) includes promyelocytes, myelocytes and metamyelocytes but does not include bands. Percent differential counts (%) should be interpreted in the context of the absolute cell counts (cells/UL).  · Lymphocytes % 04/18/2024 28.7  13.0 - 44.0 % Final  · Monocytes % 04/18/2024 7.6  2.0 - 10.0 % Final  · Eosinophils % 04/18/2024 1.6  0.0 - 6.0 % Final  · Basophils % 04/18/2024 0.2  0.0 - 2.0 % Final  · Neutrophils Absolute 04/18/2024 5.47  1.20 - 7.70 x10*3/uL Final   Percent differential counts (%) should be interpreted in the context of the absolute cell counts (cells/uL).  · Immature Granulocytes Absolute, Au* 04/18/2024 0.02  0.00 - 0.70 x10*3/uL Final  · Lymphocytes Absolute 04/18/2024 2.54  1.20 - 4.80 x10*3/uL Final  · Monocytes Absolute 04/18/2024 0.67  0.10 - 1.00 x10*3/uL Final  · Eosinophils Absolute 04/18/2024 0.14  0.00 - 0.70 x10*3/uL Final  · Basophils Absolute 04/18/2024 0.02  0.00 - 0.10 x10*3/uL Final  · Hemoglobin A1C 04/18/2024 5.7 (H)  see below  % Final  · Estimated Average Glucose 04/18/2024 117  Not Established mg/dL Final  · Rheumatoid Factor 04/18/2024 <10  0 - 15 IU/mL Final  · Sedimentation Rate 04/18/2024 10  0 - 20 mm/h Final  · HIV 1/2 Antigen/Antibody Screen wi* 04/18/2024 Nonreactive  Nonreactive Final  · C-Reactive Protein 04/18/2024 0.20  <1.00 mg/dL Final  · Cortisol  A.M. 04/18/2024 21.5 (H)  5.0 - 20.0 ug/dL Final  · Iron 04/18/2024 55  35 - 150 ug/dL Final  · UIBC 04/18/2024 322  110 - 370 ug/dL Final  · TIBC 04/18/2024 377  240 - 445 ug/dL Final  · % Saturation 04/18/2024 15 (L)  25 - 45 % Final  · Estrogen, Total 04/18/2024 118  pg/mL Final                            Prepubertal             < 40                           Female Cycle:                             1-10 Days         16 - 328                             11-20 Days        34 - 501                             21-30 Days        48 - 350                             Post-Menopausal   40 - 244  · Follicle Stimulating Hormone 04/18/2024 16.0  IU/L Final   FSH Ref Values  Follicular   2.0-12.0  IU/L  Mid-Cycle        12.0-25.0  IU/L  Luteal Phase      2.0-12.0  IU/L  Menopause       30.0-150.0  IU/L  Pre-puberty     50% Adult IU/L  Adult Male        2.0-10.0  IU/L   Infants          0.0-1.0  IU/L  · Luteinizing Hormone 04/18/2024 9.0  IU/L Final   LH Reference Values  Follicular Phase          1.9-12.5 IU/L  Mid-Cycle                 8.7-76.3 IU/L  Luteal Phase              0.5-16.9 IU/L  Post Menopause            5.0-55.2 IU/L  Children                    0- 6.0 IU/L  Adult Male 18-70 years    1.5- 9.3 IU/L  Adult Male >70 years      3.1-34.6 IU/L  · CRP, High Sensitivity 04/18/2024 1.7 (H)  <1.0 mg/L Final  · VONNIE 04/18/2024 Negative  Negative Final   The Antinuclear Antibody (VONNIE) test was performed using  indirect immunofluorescence assay with HEp-2 cells slide.  · Thyroid Peroxidase (TPO) Antibody 04/18/2024 49  <=60 IU/mL Final  Office Visit on  04/03/2024  Component Date Value Ref Range Status  · Case Report 04/03/2024    Final                   Value:Gynecologic Cytology                              Case: [id number]                                   Authorizing Provider:  ROXY Murdock Collected:           04/03/2024 1122              Ordering Location:     [facility]      Received:            04/03/2024 1122              First Screen:          Celess Megan, CT                                                               Pathologist:           Jessica Aponte,                                                           Specimen:    ThinPrep Liquid-Based Pap-Imaging System Screen, CERVIX, SCREENING                       · Final Cytological Interpretation 04/03/2024    Final                   Value:This result contains rich text formatting which cannot be displayed here.  ·   04/03/2024    Final                   Value:This result contains rich text formatting which cannot be displayed here.  · ThinPrep Imaging System 04/03/2024    Final                   Value:This result contains rich text formatting which cannot be displayed here.  · Educational Note 04/03/2024    Final                   Value:This result contains rich text formatting which cannot be displayed here.  · Perform HPV HR test? 04/03/2024 Always (all interpretations)   Final  · Include HPV Genotype? 04/03/2024 Yes   Final  · HPV, high-risk 04/03/2024 Positive (A)  Negative Final  · HPV Type 16 DNA 04/03/2024 Negative  Negative Final  · HPV Type 18 DNA 04/03/2024 Negative  Negative Final  · HPV non-Type 16 or 18 DNA 04/03/2024 Positive (A)  Negative Final             Review of Systems   Constitutional:  Positive for fatigue (falling asleep a lot throughout the day) and unexpected weight change (gain).   HENT: Negative.     Eyes: Negative.    Respiratory: Negative.     Cardiovascular: Negative.    Gastrointestinal: Negative.    Endocrine: Negative.    Genitourinary:  "Negative.    Musculoskeletal: Negative.    Skin: Negative.    Allergic/Immunologic: Negative.    Neurological: Negative.    Hematological: Negative.    Psychiatric/Behavioral: Negative.         Objective   /67 (BP Location: Left arm, Patient Position: Sitting)   Pulse 76   Temp 36.5 °C (97.7 °F)   Resp 16   Ht 1.66 m (5' 5.35\")   Wt 69.9 kg (154 lb)   LMP 04/13/2024 (Exact Date)   SpO2 98%   BMI 25.35 kg/m²     Physical Exam  Vitals reviewed.   HENT:      Head: Normocephalic.   Cardiovascular:      Rate and Rhythm: Normal rate and regular rhythm.      Pulses: Normal pulses.      Heart sounds: Normal heart sounds.   Pulmonary:      Effort: Pulmonary effort is normal.      Breath sounds: Normal breath sounds.   Neurological:      General: No focal deficit present.      Mental Status: She is alert and oriented to person, place, and time. Mental status is at baseline.         Assessment/Plan   Problem List Items Addressed This Visit             ICD-10-CM    Panic anxiety syndrome F41.0    ADHD, predominantly inattentive type F90.0    Relevant Medications    lisdexamfetamine (Vyvanse) 40 mg capsule    Mixed hyperlipidemia E78.2    Relevant Orders    Lipid panel    Polyp of cervix N84.1     Pending pathology         Anti-TPO antibodies present R76.8     Annual TSH T4 measurement         Prediabetes - Primary R73.03    Relevant Medications    metFORMIN XR (Glucophage-XR) 500 mg 24 hr tablet    Other Relevant Orders    Hemoglobin A1C   Recheck A1C in 3-6 months. Discussed movement after meals and getting up/walking around frequently.     "

## 2024-05-09 LAB
LAB AP ASR DISCLAIMER: NORMAL
LAB AP ASR DISCLAIMER: NORMAL
LABORATORY COMMENT REPORT: NORMAL
LABORATORY COMMENT REPORT: NORMAL
PATH REPORT.COMMENTS IMP SPEC: NORMAL
PATH REPORT.COMMENTS IMP SPEC: NORMAL
PATH REPORT.FINAL DX SPEC: NORMAL
PATH REPORT.FINAL DX SPEC: NORMAL
PATH REPORT.GROSS SPEC: NORMAL
PATH REPORT.GROSS SPEC: NORMAL
PATH REPORT.RELEVANT HX SPEC: NORMAL
PATH REPORT.RELEVANT HX SPEC: NORMAL
PATH REPORT.TOTAL CANCER: NORMAL
PATH REPORT.TOTAL CANCER: NORMAL

## 2024-05-09 ASSESSMENT — ENCOUNTER SYMPTOMS
RESPIRATORY NEGATIVE: 1
ENDOCRINE NEGATIVE: 1
FATIGUE: 1
GASTROINTESTINAL NEGATIVE: 1
MUSCULOSKELETAL NEGATIVE: 1
HEMATOLOGIC/LYMPHATIC NEGATIVE: 1
PSYCHIATRIC NEGATIVE: 1
ALLERGIC/IMMUNOLOGIC NEGATIVE: 1
CARDIOVASCULAR NEGATIVE: 1
EYES NEGATIVE: 1
UNEXPECTED WEIGHT CHANGE: 1
NEUROLOGICAL NEGATIVE: 1

## 2024-05-23 ENCOUNTER — APPOINTMENT (OUTPATIENT)
Dept: OBSTETRICS AND GYNECOLOGY | Facility: CLINIC | Age: 44
End: 2024-05-23
Payer: COMMERCIAL

## 2024-05-24 ENCOUNTER — HOSPITAL ENCOUNTER (OUTPATIENT)
Dept: RADIOLOGY | Facility: CLINIC | Age: 44
Discharge: HOME | End: 2024-05-24
Payer: COMMERCIAL

## 2024-05-24 DIAGNOSIS — F41.0 PANIC DISORDER: ICD-10-CM

## 2024-05-24 DIAGNOSIS — F41.1 GAD (GENERALIZED ANXIETY DISORDER): ICD-10-CM

## 2024-05-24 DIAGNOSIS — E78.2 MIXED HYPERLIPIDEMIA: ICD-10-CM

## 2024-05-24 PROCEDURE — 75571 CT HRT W/O DYE W/CA TEST: CPT

## 2024-05-24 RX ORDER — CLONAZEPAM 0.5 MG/1
0.5 TABLET, ORALLY DISINTEGRATING ORAL 2 TIMES DAILY
Qty: 60 TABLET | Refills: 0 | Status: SHIPPED | OUTPATIENT
Start: 2024-05-24

## 2024-06-03 ENCOUNTER — APPOINTMENT (OUTPATIENT)
Dept: OBSTETRICS AND GYNECOLOGY | Facility: CLINIC | Age: 44
End: 2024-06-03
Payer: COMMERCIAL

## 2024-06-07 NOTE — PROGRESS NOTES
Subjective   Patient ID: [id number]   Caro Dewitt is a 43 y.o. female with fibromyalgia, angioedema, DL, goiter, preDM, anxiety, depression, ADHD and + anti-TPO Abs, who presents for   No referrals    Current rheum meds:  - None    Previous rheum meds:  - None    HPI  Saw Dr. Harrell in in 1/2018 diagnosed her with fibromyalgia       PSH:  Allergies:  Habits:  Social hx:    ROS:  Constitutional: Denies fever, chills, weight loss, night sweats or headaches  Eyes: Denies dry eyes, blurry vision, redness or pain  ENT: Denies dry mouth, dental loss, loss of taste, nasal or oral ulcers, jaw claudication, difficulty swallowing, nasal crusting or recurrent sinus infections   Cardiovascular: Denies chest pain, palpitations, orthopnea  Respiratory: Denies shortness of breath, cough, asthma, or recurrent respiratory infections  Gastrointestinal: Denies dysphagia, nausea, vomiting, heartburn, abdominal pain, constipation, diarrhea, melena or hematochezia  Genitourinary: No recurrent urinary infections or STDs, no genital or anal ulcers  Integumentary: Denies photosensitivity, rash or lesions, Raynaud's phenomenon, skin tightening, digital ulcers, psoriatic lesions, or alopecia  Neurological: Denies any numbness or tingling, muscle weakness, or incontinence   Hematologic/Lymphatic: Denies bleeding, bruising, history of clots (arterial or venous), or abortions/miscarriages/pregnancy complications   MSK: No joint pains, redness, hotness or swelling. No inflammatory back pain, enthesitis, dactylitis. No morning stiffness   Muscular: Denies weakness, difficulty rising from chair or combing the hair, muscle aches, or problems with hand    FHx: No family history of autoimmune diseases     Patient Active Problem List   Diagnosis    Panic anxiety syndrome    Recurrent major depressive disorder, in remission (CMS-HCC)    Anxiety and depression    ADHD, predominantly inattentive type    Mixed hyperlipidemia    Polyp of cervix     Hypotension    Goiter    Fibromyalgia    Angioedema    Anti-TPO antibodies present    Prediabetes      Past Medical History:   Diagnosis Date    Depression       No past surgical history on file.     Social History     Socioeconomic History    Marital status:      Spouse name: Not on file    Number of children: Not on file    Years of education: Not on file    Highest education level: Not on file   Occupational History    Not on file   Tobacco Use    Smoking status: Former     Current packs/day: 1.00     Average packs/day: 1 pack/day for 15.0 years (15.0 ttl pk-yrs)     Types: Cigarettes     Passive exposure: Never    Smokeless tobacco: Never   Vaping Use    Vaping status: Never Used   Substance and Sexual Activity    Alcohol use: Not Currently     Alcohol/week: 1.0 standard drink of alcohol     Types: 1 Standard drinks or equivalent per week    Drug use: Never    Sexual activity: Yes     Partners: Male     Birth control/protection: Condom Male   Other Topics Concern    Not on file   Social History Narrative    Not on file     Social Determinants of Health     Financial Resource Strain: Not on file   Food Insecurity: Not on file   Transportation Needs: Not on file   Physical Activity: Not on file   Stress: Not on file   Social Connections: Not on file   Intimate Partner Violence: Not on file   Housing Stability: Not on file      Allergies   Allergen Reactions    Adhesive Hives and Swelling     Current Outpatient Medications:     ARIPiprazole (Abilify) 2 mg tablet, Take 1 tablet (2 mg) by mouth once daily., Disp: 90 tablet, Rfl: 0    citalopram (CeleXA) 40 mg tablet, Take 1 tablet (40 mg) by mouth once daily., Disp: 90 tablet, Rfl: 0    clonazePAM (KlonoPIN) 0.5 mg disintegrating tablet, Take 1 tablet (0.5 mg) by mouth 2 times a day., Disp: 60 tablet, Rfl: 0    hydrOXYzine HCL (Atarax) 50 mg tablet, Take 1 tablet (50 mg) by mouth once daily at bedtime., Disp: 90 tablet, Rfl: 0    lisdexamfetamine (Vyvanse)  40 mg capsule, Take 1 capsule (40 mg) by mouth once daily in the morning., Disp: 30 capsule, Rfl: 0    metFORMIN XR (Glucophage-XR) 500 mg 24 hr tablet, Take 1 tablet (500 mg) by mouth once daily with breakfast. Do not crush, chew, or split., Disp: 90 tablet, Rfl: 0    spironolactone (Aldactone) 100 mg tablet, Take 1 tablet (100 mg) by mouth once daily., Disp: , Rfl:      Objective   Visit Vitals  OB Status Having periods   Smoking Status Former      Physical Exam:  General: AAOx3, Cooperative  Head: normocephalic, atraumatic, no hair loss   Eyes: EOMI, conjunctiva clear, sclera white, anicteric  Ears: hearing intact  Nose: no deformity, no crusting   Throat/Mouth: No oral deformities, no cheek swelling, mucosa appear moist, no oral ulcers noted or loss of dentition   Neck/Lymph: FROM, trachea midline  Lungs: chest expansion symmetric, clear to auscultation bilaterally. No wheezing, rhonchi, or stridor  Heart: S1, S2, RRR. No murmur or rub  Abdomen: Soft, non-tender without masses  Neuro: CN II-XII grossly intact, no focal deficit  Skin: No rashes, ulcers or photosensitive areas  MSK: Upper Extremities:  Hand/Fingers: No erythema, edema, tenderness or warmth at DIP, PIP, or MCP joints, FROM grossly. Good hand . No nodules. No deformities   Wrists: No erythema, edema, warmth or tenderness at wrist, FROM grossly  Elbows: No tenderness, edema, erythema or warmth at elbows, FROM grossly. No nodules   Shoulders: No edema, erythema, tenderness or warmth at shoulders. FROM  Lower Extremities:   Hips: No obvious deformities. No joint tenderness, normal ROM grossly. Log roll test negative bilaterally. Erin test is negative bilaterally. No trochanteric bursae TTP  Knees: No tenderness, deformities, edema, rashes, or warmth, normal ROM grossly. No crepitus, no pes anserine bursa TTP   Ankles, feet: No deformities, tenderness, edema, erythema, ulceration, or warmth at the ankle or MTP/IP joints, normal ROM grossly  Spine:  No spinal tenderness to palpation. No SI joint tenderness. Gaenslen test negative      Lab Results   Component Value Date    WBC 8.9 04/18/2024    HGB 14.3 04/18/2024    HCT 42.9 04/18/2024    MCV 85 04/18/2024     04/18/2024        Chemistry    Lab Results   Component Value Date/Time     04/18/2024 0835    K 4.5 04/18/2024 0835     04/18/2024 0835    CO2 31 04/18/2024 0835    BUN 16 04/18/2024 0835    CREATININE 0.93 04/18/2024 0835    Lab Results   Component Value Date/Time    CALCIUM 10.0 04/18/2024 0835    ALKPHOS 85 04/18/2024 0835    AST 12 04/18/2024 0835    ALT 10 04/18/2024 0835    BILITOT 0.3 04/18/2024 0835        Lab Results   Component Value Date    CRP 0.20 04/18/2024      Lab Results   Component Value Date    VONNIE Negative 04/18/2024    RF <10 04/18/2024    SEDRATE 10 04/18/2024     Lab Results   Component Value Date    NEUTROABS 5.47 04/18/2024     Lab Results   Component Value Date    ALT 10 04/18/2024    AST 12 04/18/2024    ALKPHOS 85 04/18/2024    BILITOT 0.3 04/18/2024     Lab Results   Component Value Date    CALCIUM 10.0 04/18/2024     CT cardiac scoring wo IV contrast  Addendum: Interpreted By:  Jose Jasso,    ADDENDUM:   Technical: The following is to serve as an over-read for an   unenhanced cardiac CT, to evaluate the extra vascular structures.        Contiguous unenhanced CT sections are performed from level the yessica   to the upper abdomen.                  Findings: The visualized portions of both lungs are clear.        There is no sign of pathologic lymph node enlargement. There is no   pericardial or pleural effusion.        Images through the upper abdomen are unremarkable.        The visualized osseous and soft tissue structures of the chest wall   are intact.                  Impression: The extra vascular structures have an unremarkable CT   appearance.        Signed by: Jose Jasso 5/25/2024 9:23 PM        -------- ORIGINAL REPORT --------    "Dictation workstation:   [id number]  Narrative: Interpreted By:  Jose Oneill,   STUDY:  CT CARDIAC SCORING WO IV CONTRAST; 5/24/2024 6:11 pm      INDICATION:  Signs/Symptoms:Elevated CRP, hyperlipidemia, smoking hx., family hx.      COMPARISON:  None.      ACCESSION NUMBER(S):  [id number]      ORDERING CLINICIAN:  АННА ASNTA      TECHNIQUE:  Using prospective ECG gating, CT scan of the coronary arteries was  performed without intravenous contrast. Coronary calcium scoring  was  performed according to the method of Agatston.      CT Dose-Length Product (DLP): 65.1 mGy*cm  CT Dose Reduction Employed: Yes, prospective gating, iterative  reconstruction.      FINDINGS:  The score and distribution of calcium in the coronary arteries is as  follows:      LM             0  LAD           0  LCx            0  RCA           0      Total         0      The visualized mid/lower ascending thoracic aorta measures 3.0 cm in  diameter. The heart is normal in size. No pericardial effusion is  present.      Impression: 1. Coronary artery calcium score of  0*.      *Coronary artery calcium scoring may be helpful in predicting the  risk for future coronary heart disease events.  According to the  American College of Cardiology Foundation Clinical Expert Consensus  Task Force, such testing provides important prognostic information in  patients with more than one coronary heart disease risk factor. The  coronary artery calcium score correlates with the annual risk of a  non-fatal myocardial infarction or coronary heart disease death.      Coronary artery score            Annual Risk      0-99                               0.4%  100-399                          1.3%  >400                              2.4%      These three \"breakpoints\" correspond to lower, intermediate and high  risk states for future coronary events.  Such information should be  used, along with appropriate clinical judgment, to make decisions  regarding the " intensity of risk factor management strategies to treat  blood lipids and to modify other non-lipid coronary risk factors.      Reference: Eliane P et al. Circulation.  2007; 115:402-426      Reading Cardiologist: Dr. Jose Oneill, Date: 5/25/2024 12:41 pm      Signed by: Jose Oneill 5/25/2024 12:42 PM  Dictation workstation:   [id number]       Assessment/Plan    This is a 43 y.o. female with fibromyalgia, angioedema, DL, goiter, preDM, anxiety, depression, ADHD and + anti-TPO Abs, who presents for   No referrals    Labs:  4/24: Vitamin D 24, vitamin B12 192, A1c 5.7%, iron sat low, hsCRP 1.7, TSH, CMP, CBC, CRP, ESR, HIV wnl  RF, VONNIE, Anti-TPO neg    Imaging:  CT cardiac scoring: Normal    #     - Labs today  - Xrays today  - I will inform the patient of any urgent results, if any    RTC in 1 month for discussion of results     Plan, including risks and benefits, was discussed with the patient, informed on how to reach us.     To schedule an appointment, call [phone]  To reach the rheumatology office, call [phone]    Luli Marcus MD      Division of Rheumatology  [facility]

## 2024-06-12 ENCOUNTER — APPOINTMENT (OUTPATIENT)
Dept: RHEUMATOLOGY | Facility: CLINIC | Age: 44
End: 2024-06-12
Payer: COMMERCIAL

## 2024-07-01 ENCOUNTER — APPOINTMENT (OUTPATIENT)
Dept: OBSTETRICS AND GYNECOLOGY | Facility: CLINIC | Age: 44
End: 2024-07-01
Payer: COMMERCIAL

## 2024-07-12 DIAGNOSIS — F90.0 ADHD, PREDOMINANTLY INATTENTIVE TYPE: ICD-10-CM

## 2024-07-12 DIAGNOSIS — F41.1 GAD (GENERALIZED ANXIETY DISORDER): ICD-10-CM

## 2024-07-12 DIAGNOSIS — F41.0 PANIC DISORDER: ICD-10-CM

## 2024-07-12 RX ORDER — CLONAZEPAM 0.5 MG/1
0.5 TABLET, ORALLY DISINTEGRATING ORAL 2 TIMES DAILY
Qty: 60 TABLET | Refills: 0 | Status: SHIPPED | OUTPATIENT
Start: 2024-07-12

## 2024-07-12 RX ORDER — LISDEXAMFETAMINE DIMESYLATE 40 MG/1
40 CAPSULE ORAL EVERY MORNING
Qty: 30 CAPSULE | Refills: 0 | Status: SHIPPED | OUTPATIENT
Start: 2024-07-12 | End: 2024-08-11

## 2024-07-15 ENCOUNTER — OFFICE VISIT (OUTPATIENT)
Dept: NEUROLOGY | Facility: CLINIC | Age: 44
End: 2024-07-15
Payer: COMMERCIAL

## 2024-07-15 VITALS
TEMPERATURE: 97.3 F | WEIGHT: 154.7 LBS | HEIGHT: 65 IN | DIASTOLIC BLOOD PRESSURE: 62 MMHG | HEART RATE: 79 BPM | SYSTOLIC BLOOD PRESSURE: 100 MMHG | BODY MASS INDEX: 25.77 KG/M2

## 2024-07-15 DIAGNOSIS — H02.401 PTOSIS OF RIGHT EYELID: ICD-10-CM

## 2024-07-15 DIAGNOSIS — R47.81 SLURRED SPEECH: ICD-10-CM

## 2024-07-15 DIAGNOSIS — R29.2 BRISK DEEP TENDON REFLEXES: ICD-10-CM

## 2024-07-15 DIAGNOSIS — H53.2 DOUBLE VISION: ICD-10-CM

## 2024-07-15 DIAGNOSIS — R20.2 PARESTHESIA: Primary | ICD-10-CM

## 2024-07-15 PROCEDURE — 4004F PT TOBACCO SCREEN RCVD TLK: CPT | Performed by: PSYCHIATRY & NEUROLOGY

## 2024-07-15 PROCEDURE — 99205 OFFICE O/P NEW HI 60 MIN: CPT | Performed by: PSYCHIATRY & NEUROLOGY

## 2024-07-15 ASSESSMENT — PATIENT HEALTH QUESTIONNAIRE - PHQ9
9. THOUGHTS THAT YOU WOULD BE BETTER OFF DEAD, OR OF HURTING YOURSELF: NOT AT ALL
3. TROUBLE FALLING OR STAYING ASLEEP: NEARLY EVERY DAY
8. MOVING OR SPEAKING SO SLOWLY THAT OTHER PEOPLE COULD HAVE NOTICED. OR THE OPPOSITE, BEING SO FIGETY OR RESTLESS THAT YOU HAVE BEEN MOVING AROUND A LOT MORE THAN USUAL: NOT AT ALL
7. TROUBLE CONCENTRATING ON THINGS, SUCH AS READING THE NEWSPAPER OR WATCHING TELEVISION: NEARLY EVERY DAY
5. POOR APPETITE OR OVEREATING: NEARLY EVERY DAY
2. FEELING DOWN, DEPRESSED OR HOPELESS: SEVERAL DAYS
4. FEELING TIRED OR HAVING LITTLE ENERGY: NEARLY EVERY DAY
6. FEELING BAD ABOUT YOURSELF - OR THAT YOU ARE A FAILURE OR HAVE LET YOURSELF OR YOUR FAMILY DOWN: NEARLY EVERY DAY
1. LITTLE INTEREST OR PLEASURE IN DOING THINGS: NEARLY EVERY DAY
10. IF YOU CHECKED OFF ANY PROBLEMS, HOW DIFFICULT HAVE THESE PROBLEMS MADE IT FOR YOU TO DO YOUR WORK, TAKE CARE OF THINGS AT HOME, OR GET ALONG WITH OTHER PEOPLE: VERY DIFFICULT
SUM OF ALL RESPONSES TO PHQ QUESTIONS 1-9: 19
SUM OF ALL RESPONSES TO PHQ9 QUESTIONS 1 & 2: 4

## 2024-07-15 ASSESSMENT — LIFESTYLE VARIABLES
HOW OFTEN DO YOU HAVE SIX OR MORE DRINKS ON ONE OCCASION: NEVER
HOW MANY STANDARD DRINKS CONTAINING ALCOHOL DO YOU HAVE ON A TYPICAL DAY: 1 OR 2
SKIP TO QUESTIONS 9-10: 1
HOW OFTEN DO YOU HAVE A DRINK CONTAINING ALCOHOL: MONTHLY OR LESS
AUDIT-C TOTAL SCORE: 1

## 2024-07-15 NOTE — PROGRESS NOTES
Consulting Physician: Akilah Alvarez    Chief Complaint: Nerve Pain    History Of Present Illness  Caro Dewitt is a 43 y.o. female presenting with nerve pain.    About 15 years ago, the patient developed nerve pain after giving birth to her daughter.  She describes a feeling like there is a bruise at the center of her chest outward to her shoulders, neck, and arm.  In addition to the bruising feeling, she notes a stabbing sensation in those areas.  She also notes joint pain and a muscle ache.  She also notes numbness and tingling in her fingers.  She does note weakness in her arms and legs in the past.  She had balance difficulty in the past (currently, her walking is normal).  She denies any bladder incontinence.  However, she does note occasional difficulty urinating.  She notes occasional double vision (objects are side by side) - it occurs intermittently once per week lasting an hour.  She denies any loss of peripheral vision.  She notes occasional slurred speech.  She does also note intermittent ptosis of her right eye.           Past Medical History  Past Medical History:   Diagnosis Date    Depression        Surgical History  No past surgical history on file.    Family History  Family History   Problem Relation Name Age of Onset    Alcohol abuse Mother Mom     Depression Mother Mom     Alcohol abuse Sister Sister     Depression Sister Sister     Alcohol abuse Brother Brother     Depression Brother Brother         Social History   reports that she has been smoking cigarettes. She has never been exposed to tobacco smoke. She has never used smokeless tobacco. She reports that she does not currently use alcohol after a past usage of about 1.0 standard drink of alcohol per week. She reports that she does not use drugs.     Allergies  Adhesive    Medications    Current Outpatient Medications:     ARIPiprazole (Abilify) 2 mg tablet, Take 1 tablet (2 mg) by mouth once daily., Disp: 90 tablet, Rfl: 0     "citalopram (CeleXA) 40 mg tablet, Take 1 tablet (40 mg) by mouth once daily., Disp: 90 tablet, Rfl: 0    clonazePAM (KlonoPIN) 0.5 mg disintegrating tablet, Take 1 tablet (0.5 mg) by mouth 2 times a day., Disp: 60 tablet, Rfl: 0    hydrOXYzine HCL (Atarax) 50 mg tablet, Take 1 tablet (50 mg) by mouth once daily at bedtime., Disp: 90 tablet, Rfl: 0    lisdexamfetamine (Vyvanse) 40 mg capsule, Take 1 capsule (40 mg) by mouth once daily in the morning., Disp: 30 capsule, Rfl: 0    metFORMIN XR (Glucophage-XR) 500 mg 24 hr tablet, Take 1 tablet (500 mg) by mouth once daily with breakfast. Do not crush, chew, or split., Disp: 90 tablet, Rfl: 0    spironolactone (Aldactone) 100 mg tablet, Take 1 tablet (100 mg) by mouth once daily., Disp: , Rfl:       Last Recorded Vitals   Blood pressure 100/62, pulse 79, temperature 36.3 °C (97.3 °F), temperature source Temporal, height 1.651 m (5' 5\"), weight 70.2 kg (154 lb 11.2 oz), not currently breastfeeding.    Objective:  Gen: NAD  Neuro:  --HIF: A&O X 3, repetition and naming intact  --CN:  PERRLA, EOMI, VFF, no visible facial asymmetry, facial sensation intact, no tongue or palatal deviation, SCM intact  --Motor: Moves all 4 extremities equally; no focal deficits  --Sensory: Intact to light touch, intact to pinprick  --Reflex: 3+ symmetric, toes down  --Cerebellum: FTN and HTS intact  --Gait: Normal, narrow based.  Toe and Heal Walking Intact.  Tandem Intact    Relevant Results  Lab Results   Component Value Date    WBC 8.9 04/18/2024    HGB 14.3 04/18/2024    HCT 42.9 04/18/2024    MCV 85 04/18/2024     04/18/2024       Lab Results   Component Value Date    GLUCOSE 112 (H) 04/18/2024    CALCIUM 10.0 04/18/2024     04/18/2024    K 4.5 04/18/2024    CO2 31 04/18/2024     04/18/2024    BUN 16 04/18/2024    CREATININE 0.93 04/18/2024       Lab Results   Component Value Date    HGBA1C 5.7 (H) 04/18/2024       Lab Results   Component Value Date    CHOL 230 (H) " "04/18/2024     Lab Results   Component Value Date    HDL 49.6 04/18/2024     Lab Results   Component Value Date    LDLCALC 145 (H) 04/18/2024     Lab Results   Component Value Date    TRIG 178 (H) 04/18/2024     No components found for: \"CHOLHDL\"    B12 192       Assessment:  This is a 43 year old female presenting for evaluation of paresthesias.  About 15 years ago, she developed nerve pain starting in her chest radiating to her neck and down both arms.  She also notes intermittent double vision and right ptosis.  She also notes intermittent slurred speech.  On exam, she is noted to have brisk deep tendon reflexes.  Of note, her workup was notable for B12 deficiency.  Ddx includes demyelinating disease.  Other considerations include NMJ disorder.     MRI Brain, Cervical, and Thoracic Spine with and without contrast  Acetylcholine Receptor Panel        Dino Gonzales MD  [facility]  Department of Neurology      A copy of this note was sent to the referring provider.    "

## 2024-08-06 ENCOUNTER — APPOINTMENT (OUTPATIENT)
Dept: BEHAVIORAL HEALTH | Facility: CLINIC | Age: 44
End: 2024-08-06
Payer: COMMERCIAL

## 2024-08-06 ENCOUNTER — TELEMEDICINE (OUTPATIENT)
Dept: BEHAVIORAL HEALTH | Facility: CLINIC | Age: 44
End: 2024-08-06
Payer: COMMERCIAL

## 2024-08-06 DIAGNOSIS — F33.40 RECURRENT MAJOR DEPRESSIVE DISORDER, IN REMISSION (CMS-HCC): ICD-10-CM

## 2024-08-06 DIAGNOSIS — F41.1 GAD (GENERALIZED ANXIETY DISORDER): ICD-10-CM

## 2024-08-06 DIAGNOSIS — F40.00 AGORAPHOBIA: ICD-10-CM

## 2024-08-06 DIAGNOSIS — F90.0 ADHD, PREDOMINANTLY INATTENTIVE TYPE: ICD-10-CM

## 2024-08-06 DIAGNOSIS — F41.0 PANIC DISORDER: ICD-10-CM

## 2024-08-06 PROCEDURE — 99215 OFFICE O/P EST HI 40 MIN: CPT

## 2024-08-06 RX ORDER — LISDEXAMFETAMINE DIMESYLATE 50 MG/1
50 CAPSULE ORAL EVERY MORNING
Qty: 30 CAPSULE | Refills: 0 | Status: SHIPPED | OUTPATIENT
Start: 2024-08-06 | End: 2024-09-05

## 2024-08-06 RX ORDER — ARIPIPRAZOLE 5 MG/1
5 TABLET ORAL DAILY
Qty: 30 TABLET | Refills: 1 | Status: SHIPPED | OUTPATIENT
Start: 2024-08-06 | End: 2024-10-05

## 2024-08-06 RX ORDER — CLONAZEPAM 0.5 MG/1
0.5 TABLET, ORALLY DISINTEGRATING ORAL 2 TIMES DAILY
Qty: 60 TABLET | Refills: 1 | Status: SHIPPED | OUTPATIENT
Start: 2024-08-06

## 2024-08-06 ASSESSMENT — PATIENT HEALTH QUESTIONNAIRE - PHQ9
1. LITTLE INTEREST OR PLEASURE IN DOING THINGS: NEARLY EVERY DAY
9. THOUGHTS THAT YOU WOULD BE BETTER OFF DEAD, OR OF HURTING YOURSELF: NOT AT ALL
5. POOR APPETITE OR OVEREATING: NOT AT ALL
6. FEELING BAD ABOUT YOURSELF - OR THAT YOU ARE A FAILURE OR HAVE LET YOURSELF OR YOUR FAMILY DOWN: NEARLY EVERY DAY
7. TROUBLE CONCENTRATING ON THINGS, SUCH AS READING THE NEWSPAPER OR WATCHING TELEVISION: MORE THAN HALF THE DAYS
2. FEELING DOWN, DEPRESSED OR HOPELESS: NEARLY EVERY DAY
3. TROUBLE FALLING OR STAYING ASLEEP OR SLEEPING TOO MUCH: SEVERAL DAYS
8. MOVING OR SPEAKING SO SLOWLY THAT OTHER PEOPLE COULD HAVE NOTICED. OR THE OPPOSITE, BEING SO FIGETY OR RESTLESS THAT YOU HAVE BEEN MOVING AROUND A LOT MORE THAN USUAL: NOT AT ALL
10. IF YOU CHECKED OFF ANY PROBLEMS, HOW DIFFICULT HAVE THESE PROBLEMS MADE IT FOR YOU TO DO YOUR WORK, TAKE CARE OF THINGS AT HOME, OR GET ALONG WITH OTHER PEOPLE: EXTREMELY DIFFICULT
4. FEELING TIRED OR HAVING LITTLE ENERGY: NEARLY EVERY DAY

## 2024-08-06 ASSESSMENT — ANXIETY QUESTIONNAIRES
3. WORRYING TOO MUCH ABOUT DIFFERENT THINGS: NEARLY EVERY DAY
5. BEING SO RESTLESS THAT IT IS HARD TO SIT STILL: NOT AT ALL
7. FEELING AFRAID AS IF SOMETHING AWFUL MIGHT HAPPEN: SEVERAL DAYS
IF YOU CHECKED OFF ANY PROBLEMS ON THIS QUESTIONNAIRE, HOW DIFFICULT HAVE THESE PROBLEMS MADE IT FOR YOU TO DO YOUR WORK, TAKE CARE OF THINGS AT HOME, OR GET ALONG WITH OTHER PEOPLE: VERY DIFFICULT
6. BECOMING EASILY ANNOYED OR IRRITABLE: SEVERAL DAYS
GAD7 TOTAL SCORE: 14
1. FEELING NERVOUS, ANXIOUS, OR ON EDGE: NEARLY EVERY DAY
2. NOT BEING ABLE TO STOP OR CONTROL WORRYING: NEARLY EVERY DAY
4. TROUBLE RELAXING: NEARLY EVERY DAY

## 2024-08-06 NOTE — PROGRESS NOTES
"An interactive audio and video telecommunication system which permits real time communications between the patient (at the originating site) and provider (at the distant site) was utilized to provide this telehealth service.   Verbal consent was requested and obtained from Caro Dewitt on this date, 24 for a telehealth visit.     HPI  Caro Dewitt is a 43 y.o. female patient with a CC ADHD, Anxiety, Panic Attack, MDD (Major Depressive Disorder), and Med Management presenting to outpatient treatment for a scheduled psych outpatient psychiatric evaluation.   Pt identify self by name, , and address     Transferring from [facility], previous psych provider who has now left the practice for ongoing management of CHANDRAKANT with panic attacks, depression, and ADHD, and agoraphobia.    Reports \"I was suppose to have a physical appt today but after I got out of the shower, I was shaking, sweaty, feeling like I was going to pass out and it felt uncomfortable getting in the car so switched my appt to virtual.\"     Current S/Sx:  -Mood swings: stable  -Depressive mood: \"very anhedonic, zero pleasure out of anything. PHQ-9 (15)  -Fatigue/Energy: low  -Feeling hope/help/worthless: denies  -Sleep: sleep varies 5 - 12 hours with occasional naps.   -Motivation: low  -Appetite/Weight Changes: good appetite, no weight changes  -Psychosis: denies hallucinations/delusions  -SI/HI: Denies current suicidal intent/plan  -Guns/Weapons at home: denies     -Worry excessively: present, impactful  -Difficulty controlling worry: present, impactful  -Easily fatigued d/t worry: present, impactful  -Poor concentration d/t worry: present, impactful  -Sleep disturbance d/t worry: present, impactful  -Easy irritability d/t worry: present, impactful  -Restless / feeling on edge d/t worry: denies  -CHANDRAKANT (14)     Panic attacks  Onset: ~20 yrs ago, duration: until the she can get out of the situation, frequency: about 5 times/week, " associated s/sx: sweaty, feeling like passing out, SOB, palpitation, shaky, relieving factors: medication, sleep, precipitating factors: fear of leaving the house, driving, social, last one: this morning     HISTORY  PSYCH HISTORY  -Psych Hx:  CHANDRAKANT with panic attacks, depression, and ADHD, and agoraphobia.  -Psych Hospitalization Hx: denies  -Suicide Attempt Hx: denies  -Self-Harm/Violence Hx: denies  -Current psych meds: Celexa 40 mg (not helpful since starting 5 yrs ago), Vyvanse 40 mg daily, Klonopin 0.5 mg BID as needed for panic attacks (takes daily), Abilify 2 mg daily (2-3 yrs now), Hydroxyzine 50 mg at bedtime  -Psych Med Hx: Zoloft (ineffective), Wellbutrin (ineffective), Prozac (ineffective), Effexor (suicidal)     SUBSTANCE USE HISTORY  -Substance Use Hx: denies  -ETOH: rarely  -Tobacco: 1 ppd (since high school off/on)  -Caffeine: 2 - 4 coffee cups/day  -Substance Abuse Treatment Hx: denies     FAMILY HISTORY  -Family Psych Hx: both sides (mostly maternal side): anxiety, depression, ADHD etc  -Family Suicide Hx: mom, brother, sister - attempted several times  -Family Substance Abuse Hx: mom, brother, sister - alcoholism     SOCIAL HISTORY  -Upbringing: Grew up with both parents,  at a young age. 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       PHYSICAL EXAM  Physical Exam  Psychiatric:         Attention and  Perception: Attention and perception normal.         Mood and Affect: Affect normal. Mood is anxious.         Speech: Speech normal.         Behavior: Behavior normal. Behavior is cooperative.         Thought Content: Thought content normal.         Cognition and Memory: Cognition and memory normal.         Judgment: Judgment normal.        IMPRESSION  ADHD, Anxiety, Panic Attack, MDD (Major Depressive Disorder), and Med Management    Notes moderate to severe anxiety and depression not controlled by current antidepressant per subjective and objective assessments, PHQ-9 (15) and CHANDRAKANT-7 (14). Notes stable mood. No hallucinations/delusion/doni/hypomania/SI reported. Appetite is good and sleeps average to poor at times. No side effects or substance use concerns at this time. Discussed to taper off Celexa and place on Trintellix for more effective management of anixety and depression. Increased Vyvanse to improve attentiveness as well as Abilfy to enhance Trintellix in managing depressive symptoms including motivation and resistant depression. Continue Hydroxyzine and Klonopin as before.      SI/HI ASSESSMENT  -Risk Assessment: Caro Dewitt is currently a low acute risk of suicide and self-harm due to no past suicide attempt(s) and not currently endorsing thoughts of suicide.   -Suicidal Risk Factors: , history of trauma/abuse, chronic pain, current psychiatric illness, feelings of hopelessness, and panic attacks  -Protective Factors: strong coping skills, sense of responsibility towards family, positive family relationships, hopefulness/future orientation, marriage/partnership, and employment  -Plan to Reduce Risk: increase coping skills .     PLAN  Reviewed diagnostic impression including subjective and objective data and provided education about Panic attacks, MDD, CHANDRAKANT, ADHD, and Sleep disturbance, etiology, treatment recommendations including medication, therapy, course of treatment and prognosis.  Patient amenable to treatment plan.      Dx: ADHD, Anxiety, Panic Attack, MDD (Major Depressive Disorder), and Med Management  START Trintellix 5 mg daily x 14 days starting 8/20. Will increase to 10 mg after 14 days (9/3/24 during on day of next appt)  DECREASE Celexa 20 mg x2 wks daily, then 10 mg x2 wks daily, then 5 mg x2 wks daily, then STOP  INCREASE Abilify 5 mg daily  INCREASE Vyvanse 50 mg daily  CONTINUE Hydroxyzine 50 mg at bedtime  CONTINUE Klonopin 0.5 mg BID as needed for panic attacks/severe anxiety     Reviewed r/b/a, possible side effects of the medication. Client is aware about the benefit outweighs the risk.     Psychotherapy: tried several times but didn't have success    Labs reviewed     OARRS  I have personally reviewed the OARRS report for Caro Dewitt. I have considered the risks of abuse, dependence, addiction and diversion. Last filled Vyvanse 40 mg on 07/12/2024  (30 caps x 30 days), Klonopin 0.5 mg on 07/12/2024 (60 tabs x 30 days)    Last Urine Drug Screen  Date of Last Screen: 10/03/2023    Controlled Substance Agreement:  Date of the Last Agreement: 5/7/2024   Reviewed Controlled Substance Agreement including but not limited to the benefits, risks, and alternatives to treatment with a Controlled Substance medication(s).      -Follow-up with this provider in 4 weeks.    - Follow up with physical health providers as scheduled  -Risks/benefits/assessment of medication interventions discussed with pt; pt agreeable to plan. Will continue to monitor for symptoms mgmt and SEs and adjust plan as needed.  -MI to increase coping skills/behavior regulation.  -Safety plan reviewed.  -Call  Psychiatry at [phone] with issues.  -For [facility] residents, [employer] is a 24/7 hotline you can call for assistance at [phone] [phone]. Please call 124 or go to your closest Emergency Room if you feel worse. This includes thoughts of hurting yourself or anyone else, or having other  troubles such as hearing voices, seeing visions, or having new and scary thoughts about the people around you.

## 2024-08-12 ENCOUNTER — APPOINTMENT (OUTPATIENT)
Dept: DERMATOLOGY | Facility: CLINIC | Age: 44
End: 2024-08-12
Payer: COMMERCIAL

## 2024-08-19 ENCOUNTER — HOSPITAL ENCOUNTER (OUTPATIENT)
Dept: RADIOLOGY | Facility: CLINIC | Age: 44
Discharge: HOME | End: 2024-08-19
Payer: COMMERCIAL

## 2024-08-19 ENCOUNTER — LAB (OUTPATIENT)
Dept: LAB | Facility: LAB | Age: 44
End: 2024-08-19
Payer: COMMERCIAL

## 2024-08-19 DIAGNOSIS — R29.2 BRISK DEEP TENDON REFLEXES: ICD-10-CM

## 2024-08-19 DIAGNOSIS — H02.401 PTOSIS OF RIGHT EYELID: ICD-10-CM

## 2024-08-19 DIAGNOSIS — R20.2 PARESTHESIA: ICD-10-CM

## 2024-08-19 DIAGNOSIS — R47.81 SLURRED SPEECH: ICD-10-CM

## 2024-08-19 DIAGNOSIS — H53.2 DOUBLE VISION: ICD-10-CM

## 2024-08-19 DIAGNOSIS — E78.2 MIXED HYPERLIPIDEMIA: ICD-10-CM

## 2024-08-19 DIAGNOSIS — R73.03 PREDIABETES: ICD-10-CM

## 2024-08-19 LAB
CHOLEST SERPL-MCNC: 229 MG/DL (ref 0–199)
CHOLESTEROL/HDL RATIO: 5.1
HDLC SERPL-MCNC: 44.7 MG/DL
LDLC SERPL CALC-MCNC: 141 MG/DL
NON HDL CHOLESTEROL: 184 MG/DL (ref 0–149)
TRIGL SERPL-MCNC: 217 MG/DL (ref 0–149)
VLDL: 43 MG/DL (ref 0–40)

## 2024-08-19 PROCEDURE — 80061 LIPID PANEL: CPT

## 2024-08-19 PROCEDURE — 72156 MRI NECK SPINE W/O & W/DYE: CPT

## 2024-08-19 PROCEDURE — 70553 MRI BRAIN STEM W/O & W/DYE: CPT

## 2024-08-19 PROCEDURE — 70553 MRI BRAIN STEM W/O & W/DYE: CPT | Performed by: RADIOLOGY

## 2024-08-19 PROCEDURE — 72157 MRI CHEST SPINE W/O & W/DYE: CPT

## 2024-08-19 PROCEDURE — 83036 HEMOGLOBIN GLYCOSYLATED A1C: CPT

## 2024-08-19 PROCEDURE — 83519 RIA NONANTIBODY: CPT

## 2024-08-19 PROCEDURE — 36415 COLL VENOUS BLD VENIPUNCTURE: CPT

## 2024-08-19 PROCEDURE — A9575 INJ GADOTERATE MEGLUMI 0.1ML: HCPCS | Performed by: PSYCHIATRY & NEUROLOGY

## 2024-08-19 PROCEDURE — 83516 IMMUNOASSAY NONANTIBODY: CPT

## 2024-08-19 PROCEDURE — 2550000001 HC RX 255 CONTRASTS: Performed by: PSYCHIATRY & NEUROLOGY

## 2024-08-19 RX ORDER — GADOTERATE MEGLUMINE 376.9 MG/ML
15 INJECTION INTRAVENOUS
Status: COMPLETED | OUTPATIENT
Start: 2024-08-19 | End: 2024-08-19

## 2024-08-20 DIAGNOSIS — M48.02 CERVICAL SPINAL STENOSIS: Primary | ICD-10-CM

## 2024-08-20 LAB
EST. AVERAGE GLUCOSE BLD GHB EST-MCNC: 114 MG/DL
HBA1C MFR BLD: 5.6 %

## 2024-08-21 ENCOUNTER — TELEPHONE (OUTPATIENT)
Dept: NEUROSURGERY | Facility: CLINIC | Age: 44
End: 2024-08-21
Payer: COMMERCIAL

## 2024-08-22 LAB
ACHR BIND AB SER-SCNC: 0 NMOL/L (ref 0–0.4)
ACHR BLOCK AB/ACHR TOTAL SFR SER: 4 % (ref 0–26)
ACHR MOD AB/ACHR TOTAL SFR SER: 0 %

## 2024-08-26 ENCOUNTER — APPOINTMENT (OUTPATIENT)
Dept: ORTHOPEDIC SURGERY | Facility: CLINIC | Age: 44
End: 2024-08-26
Payer: COMMERCIAL

## 2024-09-03 ENCOUNTER — APPOINTMENT (OUTPATIENT)
Dept: BEHAVIORAL HEALTH | Facility: CLINIC | Age: 44
End: 2024-09-03
Payer: COMMERCIAL

## 2024-09-03 VITALS — HEART RATE: 96 BPM | SYSTOLIC BLOOD PRESSURE: 100 MMHG | DIASTOLIC BLOOD PRESSURE: 66 MMHG

## 2024-09-03 DIAGNOSIS — F90.0 ADHD, PREDOMINANTLY INATTENTIVE TYPE: ICD-10-CM

## 2024-09-03 DIAGNOSIS — F41.1 GAD (GENERALIZED ANXIETY DISORDER): ICD-10-CM

## 2024-09-03 DIAGNOSIS — F33.40 RECURRENT MAJOR DEPRESSIVE DISORDER, IN REMISSION (CMS-HCC): ICD-10-CM

## 2024-09-03 DIAGNOSIS — F41.0 PANIC DISORDER: ICD-10-CM

## 2024-09-03 PROCEDURE — 99214 OFFICE O/P EST MOD 30 MIN: CPT

## 2024-09-03 RX ORDER — LISDEXAMFETAMINE DIMESYLATE 50 MG/1
50 CAPSULE ORAL EVERY MORNING
Qty: 30 CAPSULE | Refills: 0 | Status: SHIPPED | OUTPATIENT
Start: 2024-09-12 | End: 2024-10-12

## 2024-09-03 RX ORDER — HYDROXYZINE HYDROCHLORIDE 50 MG/1
50 TABLET, FILM COATED ORAL NIGHTLY
Qty: 90 TABLET | Refills: 1 | Status: SHIPPED | OUTPATIENT
Start: 2024-09-03

## 2024-09-03 RX ORDER — LISDEXAMFETAMINE DIMESYLATE 50 MG/1
50 CAPSULE ORAL EVERY MORNING
Qty: 30 CAPSULE | Refills: 0 | Status: SHIPPED | OUTPATIENT
Start: 2024-10-13 | End: 2024-11-12

## 2024-09-03 RX ORDER — ARIPIPRAZOLE 5 MG/1
5 TABLET ORAL DAILY
Qty: 90 TABLET | Refills: 1 | Status: SHIPPED | OUTPATIENT
Start: 2024-09-03 | End: 2025-03-02

## 2024-09-03 RX ORDER — LISDEXAMFETAMINE DIMESYLATE 50 MG/1
50 CAPSULE ORAL EVERY MORNING
Qty: 30 CAPSULE | Refills: 0 | Status: SHIPPED | OUTPATIENT
Start: 2024-11-13 | End: 2024-12-13

## 2024-09-03 NOTE — PROGRESS NOTES
"An interactive audio and video telecommunication system which permits real time communications between the patient (at the originating site) and provider (at the distant site) was utilized to provide this telehealth service.   Verbal consent was requested and obtained from Caro Dewitt on this date, 24 for a telehealth visit.     HPI  Caro Dewitt is a 43 y.o. female patient with a CC ADHD, Anxiety, OTHER (Agoraphobia), Depression, and Panic Attack presenting to outpatient treatment for a scheduled psych outpatient psychiatric evaluation.   Pt identify self by name, , and address     2024  States she completed Celexa wean about a week ago and stated the Celexa. Reports some brain fog, \"brain shivers, brain zaps which she has previously experienced with Zoloft especially when she misses a dose or 2.\" Memory/concentration has improved with the increased dose of Vyvanse. Sleep and appetite have both been normal. Denies SI/hallucinations/delusions/doni/hypomania.     Current S/Sx:  -Mood swings: stable  -Depressive mood: \"very anhedonic, zero pleasure out of anything. PHQ-9 (15)  -Fatigue/Energy: low  -Feeling hope/help/worthless: denies  -Sleep: sleep varies 5 - 12 hours with occasional naps.   -Motivation: low  -Appetite/Weight Changes: good appetite, no weight changes  -Psychosis: denies hallucinations/delusions  -SI/HI: Denies current suicidal intent/plan  -Guns/Weapons at home: denies     -Worry excessively: present, impactful  -Difficulty controlling worry: present, impactful  -Easily fatigued d/t worry: present, impactful  -Poor concentration d/t worry: present, impactful  -Sleep disturbance d/t worry: present, impactful  -Easy irritability d/t worry: present, impactful  -Restless / feeling on edge d/t worry: denies  -CHANDRAKANT (14)     Panic attacks  Onset: ~20 yrs ago, duration: until the she can get out of the situation, frequency: about 5 times/week, associated s/sx: sweaty, feeling like " passing out, SOB, palpitation, shaky, relieving factors: medication, sleep, precipitating factors: fear of leaving the house, driving, social, last one: this morning     HISTORY  PSYCH HISTORY  -Psych Hx:  CHANDRAKANT with panic attacks, depression, and ADHD, and agoraphobia.  -Psych Hospitalization Hx: denies  -Suicide Attempt Hx: denies  -Self-Harm/Violence Hx: denies  -Current psych meds: Celexa 40 mg (not helpful since starting 5 yrs ago), Vyvanse 40 mg daily, Klonopin 0.5 mg BID as needed for panic attacks (takes daily), Abilify 2 mg daily (2-3 yrs now), Hydroxyzine 50 mg at bedtime  -Psych Med Hx: Zoloft (ineffective), Wellbutrin (ineffective), Prozac (ineffective), Effexor (suicidal)     SUBSTANCE USE HISTORY  -Substance Use Hx: denies  -ETOH: rarely  -Tobacco: 1 ppd (since high school off/on)  -Caffeine: 2 - 4 coffee cups/day  -Substance Abuse Treatment Hx: denies     FAMILY HISTORY  -Family Psych Hx: both sides (mostly maternal side): anxiety, depression, ADHD etc  -Family Suicide Hx: mom, brother, sister - attempted several times  -Family Substance Abuse Hx: mom, brother, sister - alcoholism     SOCIAL HISTORY  -Upbringing: Grew up with both parents,  at a young age. 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       PHYSICAL EXAM  Physical Exam  Psychiatric:         Attention and Perception: Attention and perception  "normal.         Mood and Affect: Affect normal. Mood is anxious.         Speech: Speech normal.         Behavior: Behavior normal. Behavior is cooperative.         Thought Content: Thought content normal.         Cognition and Memory: Cognition and memory normal.         Judgment: Judgment normal.        IMPRESSION  ADHD, Anxiety, OTHER (Agoraphobia), Depression, and Panic Attack    Notes slightly improved anxiety or depression since starting on Trintellix.  Reports some \"brain zaps\" with Celexa wean, which she previously experienced with Zoloft especially when doses were skipped and during weaning.  Otherwise, reports overall improvement with focus/attention/memory, anxiety and depressive symptoms.  Notes stable mood. No hallucinations/delusion/doni/hypomania/SI reported. Appetite and sleep are good. No side effects or substance use concerns at this time.  Discussed to continue current regimen for an additional 5 weeks then reevaluate thereafter.       SI/HI ASSESSMENT  -Risk Assessment: Caro Dewitt is currently a low acute risk of suicide and self-harm due to no past suicide attempt(s) and not currently endorsing thoughts of suicide.   -Suicidal Risk Factors: , history of trauma/abuse, chronic pain, current psychiatric illness, feelings of hopelessness, and panic attacks  -Protective Factors: strong coping skills, sense of responsibility towards family, positive family relationships, hopefulness/future orientation, marriage/partnership, and employment  -Plan to Reduce Risk: increase coping skills .     PLAN  Reviewed diagnostic impression including subjective and objective data and provided education about Panic attacks, MDD, CHANDRAKANT, ADHD, and Sleep disturbance, etiology, treatment recommendations including medication, therapy, course of treatment and prognosis. Patient amenable to treatment plan.      Dx: ADHD, Anxiety, OTHER (Agoraphobia), Depression, and Panic Attack  CONTINUE Trintellix 10 mg " daily   CONTINUE Abilify 5 mg daily  CONTINUE Vyvanse 50 mg daily  CONTINUE Hydroxyzine 50 mg at bedtime  CONTINUE Klonopin 0.5 mg BID as needed for panic attacks/severe anxiety     Reviewed r/b/a, possible side effects of the medication. Client is aware about the benefit outweighs the risk.     Psychotherapy: tried several times but didn't have success    Labs reviewed     OARRS  I have personally reviewed the OARRS report for Caro DAHL AVELINA Dewitt. I have considered the risks of abuse, dependence, addiction and diversion. Last filled Vyvanse 50 mg on 08/12/2024 (30 caps x 30 days), Klonopin 0.5 mg on 07/12/2024 (60 tabs x 30 days)    Last Urine Drug Screen  Date of Last Screen: 10/03/2023    Controlled Substance Agreement:  Date of the Last Agreement: 5/7/2024   Reviewed Controlled Substance Agreement including but not limited to the benefits, risks, and alternatives to treatment with a Controlled Substance medication(s).      -Follow-up with this provider in 5 weeks.    - Follow up with physical health providers as scheduled  -Risks/benefits/assessment of medication interventions discussed with pt; pt agreeable to plan. Will continue to monitor for symptoms mgmt and SEs and adjust plan as needed.  -MI to increase coping skills/behavior regulation.  -Safety plan reviewed.  -Call  Psychiatry at [phone] with issues.  -For [facility] residents, [employer] is a 24/7 hotline you can call for assistance at [phone]) 500-7187. Please call 911 or go to your closest Emergency Room if you feel worse. This includes thoughts of hurting yourself or anyone else, or having other troubles such as hearing voices, seeing visions, or having new and scary thoughts about the people around you.

## 2024-09-04 ENCOUNTER — APPOINTMENT (OUTPATIENT)
Dept: NEUROLOGY | Facility: CLINIC | Age: 44
End: 2024-09-04
Payer: COMMERCIAL

## 2024-09-05 NOTE — PROGRESS NOTES
Caro Dewtit is a 43 y.o. year old female p/w 16 years of chronic pain that has developed into bilateral upper extremity pain    14/14 systems reviewed and negative other than what is listed in the history of present illness        Past Medical History:   Diagnosis Date    Depression        History reviewed. No pertinent surgical history.        Current Outpatient Medications:     ARIPiprazole (Abilify) 5 mg tablet, Take 1 tablet (5 mg) by mouth once daily., Disp: 90 tablet, Rfl: 1    clonazePAM (KlonoPIN) 0.5 mg disintegrating tablet, Take 1 tablet (0.5 mg) by mouth 2 times a day., Disp: 60 tablet, Rfl: 1    hydrOXYzine HCL (Atarax) 50 mg tablet, Take 1 tablet (50 mg) by mouth once daily at bedtime., Disp: 90 tablet, Rfl: 1    lisdexamfetamine (Vyvanse) 50 mg capsule, Take 1 capsule (50 mg) by mouth once daily in the morning., Disp: 30 capsule, Rfl: 0    [START ON 9/12/2024] lisdexamfetamine (Vyvanse) 50 mg capsule, Take 1 capsule (50 mg) by mouth once daily in the morning. Do not fill before September 12, 2024., Disp: 30 capsule, Rfl: 0    [START ON 10/13/2024] lisdexamfetamine (Vyvanse) 50 mg capsule, Take 1 capsule (50 mg) by mouth once daily in the morning. Do not fill before October 13, 2024., Disp: 30 capsule, Rfl: 0    [START ON 11/13/2024] lisdexamfetamine (Vyvanse) 50 mg capsule, Take 1 capsule (50 mg) by mouth once daily in the morning. Do not fill before November 13, 2024., Disp: 30 capsule, Rfl: 0    metFORMIN XR (Glucophage-XR) 500 mg 24 hr tablet, Take 1 tablet (500 mg) by mouth once daily with breakfast. Do not crush, chew, or split., Disp: 90 tablet, Rfl: 0    spironolactone (Aldactone) 100 mg tablet, Take 1 tablet (100 mg) by mouth once daily., Disp: , Rfl:     [START ON 9/12/2024] vortioxetine (Trintellix) 10 mg tablet tablet, Take 1 tablet (10 mg) by mouth once daily. Do not fill before September 12, 2024., Disp: 30 tablet, Rfl: 1    vortioxetine (Trintellix) 5 mg tablet tablet, Take 1  tablet (5 mg) by mouth once daily for 14 days, THEN 2 tablets (10 mg) once daily for 14 days. Do not fill before August 20, 2024., Disp: 42 tablet, Rfl: 0      Vitals:    09/06/24 1119   BP: 102/72   Pulse: 99     Objective   General: Well developed, awake/alert/oriented x3, no distress, alert and cooperative  Skin: Warm and dry, no lesions, no rashes  ENMT: Mucous membranes moist, no apparent injury, no lesions seen  Head/Neck: Neck Supple, no apparent injury  Respiratory/Thorax: Normal breath sounds with good chest expansion, thorax symmetric  Cardiovascular: No pitting edema, no JVD    Motor Strength: 5/5 Throughout all extremities    Muscle Bulk: Normal and symmetric in all extremities    Posture:   -- Cervical: Normal  -- Thoracic: Normal  -- Lumbar : Normal  Paraspinal muscle spasm/tenderness absent.     Sensation: intact to light touch     Relevant Results    needs upright xrays needs CT C-spine MRI Brain: neg  MRI C-spine: C5-6 disc herniation with cord compression, C6-7 disc herniation with mild pressure on cord   MRI T-spine: neg       Problem List Items Addressed This Visit       Cervical spinal stenosis    Relevant Orders    XR cervical spine complete 6+ views    CT cervical spine wo IV contrast    Nicotine and Metabolites,S    Cervical spondylosis with myelopathy - Primary    Relevant Orders    XR cervical spine complete 6+ views    CT cervical spine wo IV contrast    Nicotine and Metabolites,S          Assessment/Plan   Caro Dewitt is a 43 y.o. female patient who was referred by Dr. Gonzales for nerve pain. She has a h/o longstanding chest pain for about 16 years.. This felt like a bruise that eventually radiated to back, neck, and shoulders. She reports often has muscle spasms in her back. Her pain began to wax and wane after giving birth to her second daughter.     She feels sometimes uncomfortable while walking and feels like falling. Her arms go numb when using mouse and keyboard for too  long.     She has visited pain management and physical therapy without improvement. She utilizes Motrin 600-800 mg then a course of steroids and muscle relaxants if that does not help.     She is a current smoker at 1ppd. I told her she will not be able to have surgery until she quits smoking. I will check a nicotine test 2 weeks prior to surgery.     The patient has exhausted non-operative management without significant improvement. We discussed that surgical intervention would be a good option for her, but may not address all of her various symptoms. I believe that only her neck pain and BUE radiculopathy are related to the disc herniations seen on MRI C-spine.     We had an extensive discussion about the risks and benefits of surgery. I discussed risks include risk of bleeding, infection, swallowing difficulties, C5 palsy, pseudoarthrosis and anesthesia risks. She was instructed to quit smoking before we are able to proceed.    Braden Stubbs MD    of Neurosurgery   [facility] [place]   [facility]   Office: [phone]  Fax: [phone]      Scribe Attestation  By signing my name below, I, Reji Hyatt, Scribe   attest that this documentation has been prepared under the direction and in the presence of Braden Stubbs MD.

## 2024-09-06 ENCOUNTER — APPOINTMENT (OUTPATIENT)
Dept: NEUROSURGERY | Facility: CLINIC | Age: 44
End: 2024-09-06
Payer: COMMERCIAL

## 2024-09-06 VITALS
HEIGHT: 65 IN | BODY MASS INDEX: 26.56 KG/M2 | HEART RATE: 99 BPM | WEIGHT: 159.4 LBS | DIASTOLIC BLOOD PRESSURE: 72 MMHG | SYSTOLIC BLOOD PRESSURE: 102 MMHG

## 2024-09-06 DIAGNOSIS — M47.12 CERVICAL SPONDYLOSIS WITH MYELOPATHY: ICD-10-CM

## 2024-09-06 DIAGNOSIS — M48.02 CERVICAL SPINAL STENOSIS: ICD-10-CM

## 2024-09-06 DIAGNOSIS — M47.12 CERVICAL SPONDYLOSIS WITH MYELOPATHY: Primary | ICD-10-CM

## 2024-09-06 PROCEDURE — 3008F BODY MASS INDEX DOCD: CPT | Performed by: STUDENT IN AN ORGANIZED HEALTH CARE EDUCATION/TRAINING PROGRAM

## 2024-09-06 PROCEDURE — 99205 OFFICE O/P NEW HI 60 MIN: CPT | Performed by: STUDENT IN AN ORGANIZED HEALTH CARE EDUCATION/TRAINING PROGRAM

## 2024-09-06 PROCEDURE — 4004F PT TOBACCO SCREEN RCVD TLK: CPT | Performed by: STUDENT IN AN ORGANIZED HEALTH CARE EDUCATION/TRAINING PROGRAM

## 2024-09-06 ASSESSMENT — PATIENT HEALTH QUESTIONNAIRE - PHQ9
1. LITTLE INTEREST OR PLEASURE IN DOING THINGS: NOT AT ALL
SUM OF ALL RESPONSES TO PHQ9 QUESTIONS 1 AND 2: 0
2. FEELING DOWN, DEPRESSED OR HOPELESS: NOT AT ALL

## 2024-09-06 ASSESSMENT — PAIN SCALES - GENERAL: PAINLEVEL: 6

## 2024-09-11 ENCOUNTER — APPOINTMENT (OUTPATIENT)
Dept: PRIMARY CARE | Facility: CLINIC | Age: 44
End: 2024-09-11
Payer: COMMERCIAL

## 2024-09-11 VITALS
RESPIRATION RATE: 18 BRPM | WEIGHT: 157 LBS | DIASTOLIC BLOOD PRESSURE: 66 MMHG | HEART RATE: 90 BPM | OXYGEN SATURATION: 99 % | BODY MASS INDEX: 26.16 KG/M2 | SYSTOLIC BLOOD PRESSURE: 114 MMHG | HEIGHT: 65 IN

## 2024-09-11 DIAGNOSIS — L70.9 ACNE, UNSPECIFIED ACNE TYPE: Primary | ICD-10-CM

## 2024-09-11 DIAGNOSIS — L70.9 ACNE, UNSPECIFIED ACNE TYPE: ICD-10-CM

## 2024-09-11 DIAGNOSIS — R73.9 HYPERGLYCEMIA: ICD-10-CM

## 2024-09-11 DIAGNOSIS — Z72.0 TOBACCO USE: ICD-10-CM

## 2024-09-11 PROCEDURE — 99213 OFFICE O/P EST LOW 20 MIN: CPT | Performed by: NURSE PRACTITIONER

## 2024-09-11 PROCEDURE — 3008F BODY MASS INDEX DOCD: CPT | Performed by: NURSE PRACTITIONER

## 2024-09-11 RX ORDER — VARENICLINE TARTRATE 1 MG/1
1 TABLET, FILM COATED ORAL 2 TIMES DAILY
Qty: 60 TABLET | Refills: 5 | Status: SHIPPED | OUTPATIENT
Start: 2024-09-11

## 2024-09-11 RX ORDER — SPIRONOLACTONE 50 MG/1
100 TABLET, FILM COATED ORAL 2 TIMES DAILY
Qty: 120 TABLET | Refills: 2 | Status: SHIPPED | OUTPATIENT
Start: 2024-09-11 | End: 2024-12-10

## 2024-09-11 RX ORDER — VARENICLINE TARTRATE 0.5 MG/1
TABLET, FILM COATED ORAL
Qty: 11 TABLET | Refills: 0 | Status: SHIPPED | OUTPATIENT
Start: 2024-09-11 | End: 2024-09-17

## 2024-09-11 RX ORDER — METFORMIN HYDROCHLORIDE 500 MG/1
500 TABLET, EXTENDED RELEASE ORAL
Qty: 30 TABLET | Refills: 0 | Status: SHIPPED | OUTPATIENT
Start: 2024-09-11 | End: 2024-10-11

## 2024-09-11 ASSESSMENT — ENCOUNTER SYMPTOMS
MUSCULOSKELETAL NEGATIVE: 1
HEMATOLOGIC/LYMPHATIC NEGATIVE: 1
GASTROINTESTINAL NEGATIVE: 1
PSYCHIATRIC NEGATIVE: 1
RESPIRATORY NEGATIVE: 1
ALLERGIC/IMMUNOLOGIC NEGATIVE: 1
UNEXPECTED WEIGHT CHANGE: 1
ENDOCRINE NEGATIVE: 1
NEUROLOGICAL NEGATIVE: 1
EYES NEGATIVE: 1
CARDIOVASCULAR NEGATIVE: 1

## 2024-09-11 NOTE — PATIENT INSTRUCTIONS
Approaches to selecting a tobacco quit date: May either choose a fixed quit date (ie, start varenicline, then quit on day 8) or a flexible quit date (ie, start varenicline, then quit between days 8 to 35). Alternatively, a gradual quit date (ie, start varenicline and reduce smoking 50% by week 4, reduce an additional 50% by week 8, and continue reducing with a goal of complete abstinence by week 12) is acceptable (Ref).

## 2024-09-11 NOTE — PROGRESS NOTES
Subjective   Patient ID: Caro Dewitt is a 43 y.o. female who presents for Follow-up (Pt sees gyn.).    Patient is not on birth control she is having periods every 25 days as opposed to every 28 days but they are regular. They are heavy. Had no trouble conceiving- first 2 had a hard time with 3rd conceiving.   Patient had increase in Abilify and started Trintellix. Upped Vyvanse by 10 mg too not falling asleep during the day FERCHO Mcdermott-CNP is psych NP.   Taking Spironolactone for acne but not significant improvement. Patient going to start estrogen 2 mg progesterone 200 mg and DHEA 25 mg.    Subjective  Caro Dewitt is a 43 y.o. female here for discussion regarding weight loss. She has noted a weight gain of approximately 30 pounds over the last 2 years. She feels ideal weight is 135 pounds. Weight at graduation from high school was 125 pounds. History of eating disorders: none. There is a family history positive for obesity in the sister. Previous treatments for obesity include Weight Watchers, didn't have success. Obesity associated medical conditions: none. Obesity associated medications: none. Cardiovascular risk factors besides obesity: none.     Objective  Body mass index is 26.13 kg/m².    Assessment/Plan  Obesity. I assessed Caro to be in an action stage with respect to weight loss.  General weight loss/lifestyle modification strategies discussed (elicit support from others; identify saboteurs; non-food rewards, etc).  Diet interventions: increase dietary protein. Discussed ideas for protein source.        Review of Systems   Constitutional:  Positive for unexpected weight change (weight gain).   HENT: Negative.     Eyes: Negative.    Respiratory: Negative.     Cardiovascular: Negative.    Gastrointestinal: Negative.    Endocrine: Negative.    Genitourinary: Negative.    Musculoskeletal: Negative.    Skin: Negative.    Allergic/Immunologic: Negative.    Neurological:  "Negative.    Hematological: Negative.    Psychiatric/Behavioral: Negative.       Objective   /66   Pulse 90   Resp 18   Ht 1.651 m (5' 5\")   Wt 71.2 kg (157 lb)   SpO2 99%   BMI 26.13 kg/m²     Physical Exam  Vitals reviewed.   HENT:      Head: Normocephalic.   Cardiovascular:      Rate and Rhythm: Normal rate and regular rhythm.      Pulses: Normal pulses.      Heart sounds: Normal heart sounds. No murmur heard.     No friction rub. No gallop.   Pulmonary:      Effort: Pulmonary effort is normal. No respiratory distress.      Breath sounds: Normal breath sounds. No stridor. No wheezing, rhonchi or rales.   Chest:      Chest wall: No tenderness.   Neurological:      General: No focal deficit present.      Mental Status: She is alert and oriented to person, place, and time. Mental status is at baseline.         Assessment/Plan   Problem List Items Addressed This Visit    None  Visit Diagnoses         Codes    Acne, unspecified acne type    -  Primary L70.9    Relevant Medications    spironolactone (Aldactone) 50 mg tablet    Tobacco use     Z72.0    Relevant Medications    varenicline (Chantix) 0.5 mg tablet    varenicline (Chantix) 1 mg tablet    Hyperglycemia     R73.9    Relevant Medications    metFORMIN XR (Glucophage-XR) 500 mg 24 hr tablet          Patient would like to pursue Des for weight loss.      "

## 2024-09-12 RX ORDER — SPIRONOLACTONE 50 MG/1
TABLET, FILM COATED ORAL
Qty: 360 TABLET | OUTPATIENT
Start: 2024-09-12

## 2024-09-12 RX ORDER — METFORMIN HYDROCHLORIDE 500 MG/1
TABLET, EXTENDED RELEASE ORAL
Qty: 90 TABLET | OUTPATIENT
Start: 2024-09-12

## 2024-09-19 RX ORDER — CLINDAMYCIN PHOSPHATE 10 MG/G
GEL TOPICAL
COMMUNITY
Start: 2024-07-18

## 2024-09-19 RX ORDER — PROGESTERONE 200 MG/1
200 CAPSULE ORAL NIGHTLY
COMMUNITY
Start: 2024-09-05

## 2024-09-19 RX ORDER — ESTRADIOL 2 MG/1
1 TABLET ORAL
COMMUNITY
Start: 2024-09-05

## 2024-09-27 ENCOUNTER — HOSPITAL ENCOUNTER (OUTPATIENT)
Dept: RADIOLOGY | Facility: CLINIC | Age: 44
Discharge: HOME | End: 2024-09-27
Payer: COMMERCIAL

## 2024-09-27 DIAGNOSIS — M47.12 CERVICAL SPONDYLOSIS WITH MYELOPATHY: ICD-10-CM

## 2024-09-27 DIAGNOSIS — M48.02 CERVICAL SPINAL STENOSIS: ICD-10-CM

## 2024-09-27 PROCEDURE — 72125 CT NECK SPINE W/O DYE: CPT

## 2024-10-07 ENCOUNTER — APPOINTMENT (OUTPATIENT)
Dept: BEHAVIORAL HEALTH | Facility: CLINIC | Age: 44
End: 2024-10-07
Payer: COMMERCIAL

## 2024-10-07 VITALS
WEIGHT: 159.3 LBS | DIASTOLIC BLOOD PRESSURE: 76 MMHG | RESPIRATION RATE: 18 BRPM | HEIGHT: 65 IN | SYSTOLIC BLOOD PRESSURE: 111 MMHG | HEART RATE: 82 BPM | TEMPERATURE: 97.8 F | BODY MASS INDEX: 26.54 KG/M2

## 2024-10-07 DIAGNOSIS — F40.00 AGORAPHOBIA: ICD-10-CM

## 2024-10-07 DIAGNOSIS — F33.40 RECURRENT MAJOR DEPRESSIVE DISORDER, IN REMISSION (CMS-HCC): ICD-10-CM

## 2024-10-07 DIAGNOSIS — F90.0 ADHD, PREDOMINANTLY INATTENTIVE TYPE: ICD-10-CM

## 2024-10-07 DIAGNOSIS — F41.0 PANIC DISORDER: ICD-10-CM

## 2024-10-07 DIAGNOSIS — F41.1 GAD (GENERALIZED ANXIETY DISORDER): ICD-10-CM

## 2024-10-07 PROCEDURE — 99214 OFFICE O/P EST MOD 30 MIN: CPT

## 2024-10-07 PROCEDURE — 3008F BODY MASS INDEX DOCD: CPT

## 2024-10-07 ASSESSMENT — PAIN SCALES - GENERAL: PAINLEVEL: 0-NO PAIN

## 2024-10-07 NOTE — PROGRESS NOTES
"HPI  Caro Dewitt is a 43 y.o. female patient with a CC ADHD, Anxiety, Depression, Panic Attack, Sleeping Problem, and Med Management presenting to outpatient treatment for a scheduled psych outpatient psychiatric evaluation.   Pt identify self by name, , and address     10/07/2024  Reports feeling mostly stable on current regimen except several weeks of feeling down, which has somewhat worsened over time especially with ongoing stressors including anticipation of starting a new job, upcoming surgery, and other life events.  Reports feeling down to the point of tearful episodes yesterday.  However, anxiety remains controlled.  Reports near panic attack movements, but symptoms have been well-managed on current dose of Klonopin.  Agoraphobia is also improved on current regimen.  Attention/focus/concentration remains controlled on current dose of Vyvanse.  Denies SI/HI/doni/hypomania/delusions/hallucinations.  Denies substance use.  Await UDS is a-year-old, attempt to renew annual UDS.  Overall feeling mostly improved except for days of feeling down.    Current S/Sx:  -Mood swings: stable  -Depressive mood: \"very anhedonic, zero pleasure out of anything. PHQ-9 (15)  -Fatigue/Energy: low  -Feeling hope/help/worthless: denies  -Sleep: sleep varies 5 - 12 hours with occasional naps.   -Motivation: low  -Appetite/Weight Changes: good appetite, no weight changes  -Psychosis: denies hallucinations/delusions  -SI/HI: Denies current suicidal intent/plan  -Guns/Weapons at home: denies     -Worry excessively: present, impactful  -Difficulty controlling worry: present, impactful  -Easily fatigued d/t worry: present, impactful  -Poor concentration d/t worry: present, impactful  -Sleep disturbance d/t worry: present, impactful  -Easy irritability d/t worry: present, impactful  -Restless / feeling on edge d/t worry: denies  -CHANDRAKANT (14)     Panic attacks  Onset: ~20 yrs ago, duration: until the she can get out of the " situation, frequency: about 5 times/week, associated s/sx: sweaty, feeling like passing out, SOB, palpitation, shaky, relieving factors: medication, sleep, precipitating factors: fear of leaving the house, driving, social, last one: this morning     HISTORY  PSYCH HISTORY  -Psych Hx:  CHANDRAKANT with panic attacks, depression, and ADHD, and agoraphobia.  -Psych Hospitalization Hx: denies  -Suicide Attempt Hx: denies  -Self-Harm/Violence Hx: denies  -Current psych meds: Celexa 40 mg (not helpful since starting 5 yrs ago), Vyvanse 40 mg daily, Klonopin 0.5 mg BID as needed for panic attacks (takes daily), Abilify 2 mg daily (2-3 yrs now), Hydroxyzine 50 mg at bedtime  -Psych Med Hx: Zoloft (ineffective), Wellbutrin (ineffective), Prozac (ineffective), Effexor (suicidal)     SUBSTANCE USE HISTORY  -Substance Use Hx: denies  -ETOH: rarely  -Tobacco: 1 ppd (since high school off/on)  -Caffeine: 2 - 4 coffee cups/day  -Substance Abuse Treatment Hx: denies     FAMILY HISTORY  -Family Psych Hx: both sides (mostly maternal side): anxiety, depression, ADHD etc  -Family Suicide Hx: mom, brother, sister - attempted several times  -Family Substance Abuse Hx: mom, brother, sister - alcoholism     SOCIAL HISTORY  -Upbringing: Grew up with both parents,  at a young age. Has brother/sister  -income: no issues  -Support system: good  -Trauma: emotional and sexual  -Education: bachelors  -Work: Sr.  of Research at [employer]  -Marital Status: Divorce proceedings with , in a relationship  -Children: 2 girls  -Living situation: lives with 2 daughters, and a dog  -: denies  -Legal: denies      MEDICAL HISTORY  -PCP: FERCHO Quezada-CNP  -TBI/head trauma/LOC/seizure hx: denies     REVIEW OF SYSTEMS  Review of Systems   Constitutional:         Pain for 15 yrs, MRI Brain, thoracic spine, Cervical spine   All other systems reviewed and are negative.       PHYSICAL EXAM  Physical  Exam  Psychiatric:         Attention and Perception: Attention and perception normal.         Mood and Affect: Affect normal. Mood is anxious.         Speech: Speech normal.         Behavior: Behavior normal. Behavior is cooperative.         Thought Content: Thought content normal.         Cognition and Memory: Cognition and memory normal.         Judgment: Judgment normal.        IMPRESSION  ADHD, Anxiety, Depression, Panic Attack, Sleeping Problem, and Med Management    Notes mostly controlled anxiety but intermittent depressive episodes remain present on current dose of Trintellix.  Notes stable mood.  Focus/attention/memory/concentration remain stable on current dose of Vyvanse. No hallucinations/delusion/doni/hypomania/SI reported. Appetite and sleep are good. No side effects or substance use concerns at this time.  Discussed to increase Trintellix to further improve depressive symptoms.  Repeat annual UDS ordered to maintain compliance with substance use prescription.  CSA for benzos obtained.      SI/HI ASSESSMENT  -Risk Assessment: Caro Dewitt is currently a low acute risk of suicide and self-harm due to no past suicide attempt(s) and not currently endorsing thoughts of suicide.   -Suicidal Risk Factors: , history of trauma/abuse, chronic pain, current psychiatric illness, feelings of hopelessness, and panic attacks  -Protective Factors: strong coping skills, sense of responsibility towards family, positive family relationships, hopefulness/future orientation, marriage/partnership, and employment  -Plan to Reduce Risk: increase coping skills .     PLAN  Reviewed diagnostic impression including subjective and objective data and provided education about Panic attacks, MDD, CHANDRAKANT, ADHD, and Sleep disturbance, etiology, treatment recommendations including medication, therapy, course of treatment and prognosis. Patient amenable to treatment plan.      Dx: ADHD, Anxiety, Depression, Panic Attack,  Sleeping Problem, and Med Management  INCREASE Trintellix 15 mg daily   CONTINUE Abilify 5 mg daily  CONTINUE Vyvanse 50 mg daily (enough refills until 12/13/24)  CONTINUE Hydroxyzine 50 mg at bedtime  CONTINUE Klonopin 0.5 mg BID as needed for panic attacks/severe anxiety (refill Pending UDS)     Reviewed r/b/a, possible side effects of the medication. Client is aware about the benefit outweighs the risk.     Psychotherapy: tried several times but didn't have success    Labs reviewed     OARRS  I have personally reviewed the OARRS report for Caro DAHL AVELINA Dewitt. I have considered the risks of abuse, dependence, addiction and diversion. Last filled Vyvanse 50 mg on09/12/2024 (30 caps x 30 days), Klonopin 0.5 mg on 09/23/2024 (60 tabs x 30 days)    Last Urine Drug Screen  Date of Last Screen: 10/03/2023, UDS ordered    Controlled Substance Agreement:  Date of the Last Agreement: 5/7/2024 for Stimulants, 10/7/2024  for bzds  Reviewed Controlled Substance Agreement including but not limited to the benefits, risks, and alternatives to treatment with a Controlled Substance medication(s).      -Follow-up with this provider in 5 weeks.    - Follow up with physical health providers as scheduled  -Risks/benefits/assessment of medication interventions discussed with pt; pt agreeable to plan. Will continue to monitor for symptoms mgmt and SEs and adjust plan as needed.  -MI to increase coping skills/behavior regulation.  -Safety plan reviewed.  -Call  Psychiatry at [phone] with issues.  -For [facility] residents, [facility] is a 24/7 hotline you can call for assistance at [phone]. Please call 911 or go to your closest Emergency Room if you feel worse. This includes thoughts of hurting yourself or anyone else, or having other troubles such as hearing voices, seeing visions, or having new and scary thoughts about the people around you.

## 2024-10-11 NOTE — PROGRESS NOTES
Patient Name: Caro Dewitt  MRN: [id number]  Today's Date: 10/16/2024  Time Calculation  Start Time: 1616  Stop Time: 1654  Time Calculation (min): 38 min    Referring Diagnosis:  Problem List Items Addressed This Visit             ICD-10-CM    Cervical spinal stenosis M48.02    Relevant Orders    Follow Up In Physical Therapy    Cervical spondylosis with myelopathy M47.12    Relevant Orders    Follow Up In Physical Therapy    Upper extremity weakness - Primary R29.898    Relevant Orders    Follow Up In Physical Therapy       Insurance:  1/20 2024 // 0% coins, $300 / $900 ded ($300 / $600 met), $4000 / $10504 oop ($1508.91 / $2380.77 met), $25 copay, 60v per marsha yr (0v used as of 10/7/2024), no PA  [employer] Open [employer]  28481 - 04167 payable once every 180 days, 34931 payable once every 30 days  33337 Ultrasound not covered  Limited to 4 timed codes per visit  ______________________________________  Referral auth# [id number] for evaluation.  10/7/24 thru 10/7/25.  M47.12, M48.02.  03295, 03316, 55804, 92575, 53742  POC: 10.16.24 - 1.14.25  Visit Number # 1    Precautions:  Precautions  Precautions Comment: low fall risk    Subjective:  Referred by: Dr. Stubbs,   M47.12 (ICD-10-CM) - Cervical spondylosis with myelopathy   M48.02 (ICD-10-CM) - Cervical spinal stenosis     Date of Injury 15 years -April 2009  Referral date 9.6.26  Sx scheduled 11.26.24  Surgery will be for neck due to herniated disc.    Pain in neck, B upper quarter, down B arms, N+T into B hands  Pain in chest and thoracic spine and low back    Pain:  Pain Assessment: 0-10  0-10 (Numeric) Pain Score: 4  Range of pain 3-9/10 - High pain is intermittent approx 1 time a month.    Location: upper quarter, progressing into thoracic spine and chest and lower lumbar spine   Description: constant, burning, sharp,    Aggravating Factors: unknown - pain does not appear to be activity related   Relieving Factors:  steroid pack, muscle relaxer,     No headaches      Diagnostics MRI 9.27.24, see chart      Mild bilateral C5-C6 uncovertebral joint arthropathy without   significant neural foraminal stenosis.   Herniation C5-6, per MRI 7.15.24 IMPRESSION:  * Herniated nucleus polyposis to the left at C5/C6      Meds motrin PRN  Previous Treatments PT in past - last session 2015  PMHx-  Reviewed with patient and chart   Home environment lives with family multilevel home  Occupation remote desk job  Hobbies sports  Participates in yoga    Functional limitations   Walking - approx 30 min  Standing 5 min  Sitting needs to stand approx 1 time  hour    Handed R    Patients goal: Clearance for surgery and to strengthen her muscles and postural awareness to help prepare her for surgery.     Objective:  (p! indicates pain)  Posture mod forward shoulders and slight forward head.  Otherwise unremarkable    Gait -  unremarkable - no issues with stairs  Bed Mobility indep  Transfers indep    AROM %  Cervical Flexion: 100  Cervical Extension: 50  Cervical Rotation R/L: 25, 30p!  Cervical Sidebend R/L: 75, 75p!    AROM (degrees)  Overhead Reach R/L: WNL    MMT  Strength: #/5  Cervical Deep Neck Flexion Strength: F  Rhomboid Strength R/L    4-, 4-  Middle Trapezius Strength R/L 4,-, 4-  Lower Trapezius Strength R/L nt  Shoulder Flexion R/L: 4+, 4+  Shoulder Abduction R/L 4+, 4+  Shoulder ER R/L nt  Shoulder IR R/L nt      N&T B UE - varies as to which side hurts more.  Reports complaint of both numbness and tingling depending on how symptoms are presenting    Palpation TTP - moderate/severe STR B UT, interscapular, pec minor, lat insertion      Special Tests  Vertebral Artery -, -    Joint mobility  nt    Myotomes UE see MMT    Dermatomes UE light touch intact    Outcome Measure:  Other Measures  Oswestry Disablity Index (ESETLA): 50%     Treatment:  PT low complexity eval:  20/1  Ther ex/patient ed/HEP instruction 85108: 18/1  The patient was educated on: the importance of positioning, proper  posture, and body mechanics, joint mechanics and pathology, general tissue healing time, the appropriate use of heat and cold to control pain and inflammation, the importance of general therapeutic exercise, especially to stay within pain-free ROM, specific anatomy, function, & regional interdependence of involved areas, & likely cause of impairments & POC. Pt's questions were answered to their satisfaction, & pt. verbalized understanding & agreement with POC.  HEP    Access Code: [email or web address]  URL: [email or web address]  Date: 10/16/2024  Prepared by: Crystal Preston    Exercises  - Seated Scapular Retraction  - 2 x daily - 7 x weekly - 1 sets - 10 reps  - Seated Cervical Retraction with extension - 2 x daily - 7 x weekly - 1 sets - 10 reps  - Standing Shoulder Shrug Circles AROM Backward  - 2 x daily - 7 x weekly - 1 sets - 10 reps  Assessment:  Ms. Dewitt's clinical presentation includes characteristics as noted during today's evaluation consistent with needing skilled physical therapy for cervical spondylosis and a PT diagnosis of muscular weakness.  Patient presents with deficits including a decrease in postural awareness, strength, ROM and an increase in STR which is increasing patient's pain and limiting their ability to return to PLOF. Clinical findings indicate a very long history of neck pain with radicular symptoms which have become intolerable along with noted STR and muscle weakness requiring the need for skilled PT services.   These findings indicate that this patient is of low complexity, and skilled PT services are warranted in order to realize measurable and meaningful change in the above outcome measures and achieve improvements in the patient's functional status and individual goals.     Plan:  PT Plan: Skilled PT  PT Frequency: 1 time per week  Duration: 20  Onset Date: 04/01/09  Certification Period Start Date: 10/16/24  Certification Period End Date: 01/14/25  Number of Treatments  Authorized: 60  Rehab Potential: Good  Plan of Care Agreement: Patient  Planned Interventions include: therapeutic exercise, self-care home management, manual therapy, therapeutic activities, gait training, neuromuscular coordination, vasopneumatic, dry needling, aquatic therapy      Goals:  Goals: To be achieved in 10    Patient will verbalize a decrease in pain in neck/chest  by 50% in order to feel more relaxed during the day and     Patient will improve flexibility, joint mobility, and AROM in neck to 100% to improve her ability to drive and heal quicker after surgery    Patient will increase strength of  B posterior scapular muscles to at least 4 to allow the patient to be able to maintain correct posture and decrease anxious feeling.     Patient to improve balance to be able to SLS at least 20 sec on each to decrease risk for falls    Patient will improve NDI outcome measure score to  < 25% to demonstrate an overall improvement in function    Patient will improve ambulation to be able to tolerate walking 45 min with less pain to allow her to grocery shop    Patient will correctly perform HEP without cues to maintain progress and overall functional status and patient will be independent with strategies designed to manage symptoms     Patient's LTG -  Patients goal: Clearance for surgery and to strengthen her muscles and postural awareness to help prepare her for surgery.     The patient and/or caregiver was involved in the creation of PT goals and patient agrees with prognosis, goals, and need to actively participate in the POC.

## 2024-10-16 ENCOUNTER — EVALUATION (OUTPATIENT)
Dept: PHYSICAL THERAPY | Facility: CLINIC | Age: 44
End: 2024-10-16
Payer: COMMERCIAL

## 2024-10-16 DIAGNOSIS — M48.02 CERVICAL SPINAL STENOSIS: ICD-10-CM

## 2024-10-16 DIAGNOSIS — R29.898 UPPER EXTREMITY WEAKNESS: Primary | ICD-10-CM

## 2024-10-16 DIAGNOSIS — M47.12 CERVICAL SPONDYLOSIS WITH MYELOPATHY: ICD-10-CM

## 2024-10-16 PROCEDURE — 97110 THERAPEUTIC EXERCISES: CPT | Mod: GP | Performed by: PHYSICAL THERAPIST

## 2024-10-16 PROCEDURE — 97161 PT EVAL LOW COMPLEX 20 MIN: CPT | Mod: GP | Performed by: PHYSICAL THERAPIST

## 2024-10-16 ASSESSMENT — PAIN SCALES - GENERAL: PAINLEVEL_OUTOF10: 4

## 2024-10-16 ASSESSMENT — PAIN - FUNCTIONAL ASSESSMENT: PAIN_FUNCTIONAL_ASSESSMENT: 0-10

## 2024-10-16 NOTE — PROGRESS NOTES
Patient Name: Caro Dewitt  MRN: [id number]  Today's Date: 10/21/2024  Time Calculation  Start Time: 1616  Stop Time: 1657  Time Calculation (min): 41 min     Diagnosis:  Problem List Items Addressed This Visit             ICD-10-CM    Cervical spinal stenosis M48.02    Cervical spondylosis with myelopathy M47.12    Upper extremity weakness - Primary R29.898       Insurance:  2/20 2024 // 0% coins, $300 / $900 ded ($300 / $600 met), $4000 / $41019 oop ($1508.91 / $2380.77 met), $25 copay, 60v per marsha yr (0v used as of 10/7/2024), no PA  [employer] Open Access  26925 - 01752 payable once every 180 days, 58523 payable once every 30 days  46726 Ultrasound not covered  Limited to 4 timed codes per visit  ______________________________________  Referral auth# [id number] for evaluation.  10/7/24 thru 10/7/25.  M47.12, M48.02.  69364, 84854, 84726, 71444, 24385  POC: 10.16.24 - 1.14.25  Visit # 2    Precautions:  Precautions  Precautions Comment: no falls    Subjective:  Patient reports that she has increased neck/shoulder pain due to increased activity this weekend.   Reports numbness in B UE at end of day when laying down.   Patient saw pain management MD and is going to have a block done to see if it helps alleviate some of her pain.     Pain:  Pain Assessment: 0-10  0-10 (Numeric) Pain Score:  (4-5)   Location: B UT,     Objective:  Post session  improved sitting posture with improved shoulder positioning    Outcome Measure:  nt    Treatment:  There ex 33797: 18/1  Seated scapular retraction x 10  Seated neck retraction extension x 10  Seated post shoulder circles x 10  Supine horiz abduction RTB x 10  SA punches x 10     Manual therapy 41253: 23/2  STM, MFR, TrP!    In sidelying, supine  UT, levator, interscapular, pec minor, scapular lifting as able,  Cervical paraspinals, SOC    Assessment:  Patient tolerated today's session well.  She was able to advance there ex but did reports some discomfort with SA punches.   Improved STR, especially in B UT, post manual techniques however increased overall soreness noted.   Patient cont to be challenged by postural imbalances, STR, radicular symptoms from herniated disc,   as well as the need for ongoing education with regard to symptom  management and exercise progression requiring the need for additional skilled PT services in order to achieve the patient's and PT goal.  Patient demonstrates a working understanding of principals instructed and home program.    Plan:  Progress with functional strength as tolerated with respect to tissue healing. Manual therapy PRN to improve/maintain ROM, prevent adhesion, reduce pain.    HEP/Education:  Access Code: [email or web address]  URL: [email or web address]  Date: 10/21/2024  Prepared by: Crystal Preston    Exercises  - Supine Shoulder Horizontal Abduction with Resistance  - 1 x daily - 5 x weekly - 1 sets - 10 reps  - Supine Single Arm Shoulder Protraction  - 1 x daily - 5 x weekly - 1 sets - 10 reps  Issued RTB

## 2024-10-18 ENCOUNTER — OFFICE VISIT (OUTPATIENT)
Dept: PAIN MEDICINE | Facility: CLINIC | Age: 44
End: 2024-10-18
Payer: COMMERCIAL

## 2024-10-18 VITALS
DIASTOLIC BLOOD PRESSURE: 65 MMHG | SYSTOLIC BLOOD PRESSURE: 109 MMHG | TEMPERATURE: 96.1 F | HEART RATE: 86 BPM | OXYGEN SATURATION: 98 % | RESPIRATION RATE: 18 BRPM

## 2024-10-18 DIAGNOSIS — M50.20 DISC DISPLACEMENT, CERVICAL: Primary | ICD-10-CM

## 2024-10-18 PROCEDURE — 99204 OFFICE O/P NEW MOD 45 MIN: CPT | Performed by: PAIN MEDICINE

## 2024-10-18 PROCEDURE — G2211 COMPLEX E/M VISIT ADD ON: HCPCS | Performed by: PAIN MEDICINE

## 2024-10-18 PROCEDURE — 99214 OFFICE O/P EST MOD 30 MIN: CPT | Performed by: PAIN MEDICINE

## 2024-10-18 ASSESSMENT — COLUMBIA-SUICIDE SEVERITY RATING SCALE - C-SSRS
6. HAVE YOU EVER DONE ANYTHING, STARTED TO DO ANYTHING, OR PREPARED TO DO ANYTHING TO END YOUR LIFE?: NO
2. HAVE YOU ACTUALLY HAD ANY THOUGHTS OF KILLING YOURSELF?: NO

## 2024-10-18 ASSESSMENT — PAIN SCALES - GENERAL
PAINLEVEL_OUTOF10: 3
PAINLEVEL_OUTOF10: 3

## 2024-10-18 ASSESSMENT — PATIENT HEALTH QUESTIONNAIRE - PHQ9
2. FEELING DOWN, DEPRESSED OR HOPELESS: NOT AT ALL
SUM OF ALL RESPONSES TO PHQ9 QUESTIONS 1 AND 2: 0
1. LITTLE INTEREST OR PLEASURE IN DOING THINGS: NOT AT ALL

## 2024-10-18 ASSESSMENT — PAIN - FUNCTIONAL ASSESSMENT: PAIN_FUNCTIONAL_ASSESSMENT: 0-10

## 2024-10-18 NOTE — H&P
History Of Present Illness  Caro Dewitt is a 43 y.o. female presenting for chronic neck pain. Has seen pain management in the past. Receiving injections. Referred by Dr Brown who is planning neck surgery but is required by insurance to see pain management and PT first. Is participating in PT.Is taking motrin for the pain at home.   Has been complaining of the symptoms for the last 15 years they radiate from the neck area down to her shoulder bilaterally and to the front of her chest area usually the left side is worse than the right side.  Rating the pain at best at 3 and a worse at 9 out of 10 aggravated by major physical activity did not describe any alleviating factors has been taking Motrin on as needed basis with some help .  Describing the pain as a burning stabbing sensation  Past Medical History  Past Medical History:   Diagnosis Date    Depression      Patient Active Problem List   Diagnosis    Panic disorder    Recurrent major depressive disorder, in remission (CMS-HCC)    Anxiety and depression    ADHD, predominantly inattentive type    Mixed hyperlipidemia    Polyp of cervix    Hypotension    Goiter    Fibromyalgia    Angioedema    Anti-TPO antibodies present    Prediabetes    CHANDRAKANT (generalized anxiety disorder)    Agoraphobia    Cervical spinal stenosis    Cervical spondylosis with myelopathy    Upper extremity weakness       Surgical History  No past surgical history on file.    Social History  She reports that she has been smoking cigarettes. She has never been exposed to tobacco smoke. She has never used smokeless tobacco. She reports that she does not currently use alcohol after a past usage of about 1.0 standard drink of alcohol per week. She reports that she does not use drugs.    Family History  Family History   Problem Relation Name Age of Onset    Alcohol abuse Mother Mom     Depression Mother Mom     Alcohol abuse Sister Sister     Depression Sister Sister     Alcohol abuse Brother Brother      Depression Brother Brother         Allergies  Allergies   Allergen Reactions    Adhesive Hives and Swelling     Review of Systems   12 Systems have been reviewed as follows.   Constitutional: Fever, weight gain, weight loss, appetite change, night sweats, fatigue, chills.  Eyes : blurry, double vision, vision, loss, tearing, redness, pain, sensitivity to light, glaucoma.  Ears, nose, mouth, and throat: Hearing loss, ringing in the ears, ear pain, nasal congestion, nasal drainage, nosebleeds, mouth, throat, irritation tooth problem.  Cardiovascular :chest pain, pressure, heart tracing,palpaitations , sweating, leg swelling, high or low blood pressure  Pulmonary: Cough, yellow or green sputum, blood and sputum, shortness of breath, wheezing  Gastrointestinal: Nause, vomiting, diarrhea, constipation, pain, blood in stool, or vomitus, heartburn, difficulty swallowing  Genitourinary: incontinence, abnormal bleeding, abnormal discharge, urinary frequency, urinary hesitancy, pain, impotence sexual problem, infection, urinary retention  Musculoskeletal: Pain, stiffness, joint, redness or warmth, arthritis, back pain, weakness, muscle wasting, sprain or fracture  Neuro: Weight weakness, dizziness, change in voice, change in taste change in vision, change in hearing, loss, or change of sensation, trouble walking, balance problems coordination problems, shaking, speech problem  Endocrine , cold or heat intolerance, blood sugar problem, weight gain or loss missed periods hot flashes, sweats, change in body hair, change in libido, increased thirst, increased urination  Heme/lymph: Swelling, bleeding, problem anemia, bruising, enlarged lymph nodes  Allergic/immunologic: H. plus nasal drip, watery itchy eyes, nasal drainage, immunosuppressed  The above, were reviewed and noted negative except as noted.     Physical Exam   Vital signs reviewed, documented in chart     General:  Appears well, does not look in any major  distress  Alert    HEENT:  Head atraumatic  Eyes normal inspection  PERRL  Normal ENT inspection  No signs of dehydration    NECK:  Normal inspection  Range of motion within normal     RESPIRATORY:  No respiratory distress    CVS:  Heart rate and rhythm regular    ABDOMEN/GI  Soft  Non-tender  No distention  No organomegaly       EXTREMITIES:  Non-Tender  Full ROM  Normal appearance  No Pedal edema  Power symmetrical , sensory examination preserved.    NEURO:  Alert and oriented X 3  CNS normal as tested without focal neurological deficit   Sensation normal  Motor normal  reflexes normal    PSYCH:  Mood normal  Affect normal    SKIN:  Color normal  No rash  Warm  Dry  no sign of skin marking supportive of IV drug usage /abuse.     Last Recorded Vitals  Blood pressure 109/65, pulse 86, temperature 35.6 °C (96.1 °F), resp. rate 18, SpO2 98%, not currently breastfeeding.  CT cervical spine wo IV contrast    Result Date: 9/29/2024  Interpreted By:  Percy Singleton, STUDY: CT CERVICAL SPINE WO IV CONTRAST;  9/27/2024 4:09 pm   INDICATION: Signs/Symptoms:understand bony anatomy and r/o OPLL.   ,M48.02 Spinal stenosis, cervical region,M47.12 Other spondylosis with myelopathy, cervical region   COMPARISON: None.   ACCESSION NUMBER(S): [id number]   ORDERING CLINICIAN: CYNTHIA LAWSON   TECHNIQUE: Axial noncontrast images of the cervical spine with coronal and sagittal reconstructed images.   FINDINGS: PREVERTEBRAL SOFT TISSUES: Within normal limits.   CRANIOCERVICAL JUNCTION: Intact.   ALIGNMENT: Straightening of cervical lordosis, likely positional. No traumatic malalignment or traumatic facet widening.   VERTEBRAE:  No acute fracture. Vertebral body heights are maintained.   SPINAL CANAL/INTERVERTEBRAL DISCS/NEURAL FORAMINA:   C2-C3:  No significant disc protrusion, canal stenosis, or foraminal stenosis.   C3-C4: Small central disc protrusion without significant canal stenosis. No significant neural foraminal stenosis.    C4-C5: No significant disc protrusion, canal stenosis, or foraminal stenosis.   C5-C6: Mild disc height loss. Small broad-based disc spur complex resulting in effacement of the ventral thecal sac and abuts the ventral cord surface resulting in mild-moderate canal stenosis. There is mild bilateral uncovertebral joint arthropathy without significant neural foraminal stenosis.   C6-C7: Small central disc protrusion with an eccentric right disc-spur complex component slightly indents the ventral thecal sac. No significant canal stenosis or neural foraminal stenosis.   C7-T1:  No significant disc protrusion, canal stenosis, or foraminal stenosis.     OTHER: No significant abnormality.       Soft disc protrusion at C3-C4 and disc spur complexes at C5-C6 and C6-C7, most pronounced at C5-C6 resulting in effacement of the ventral thecal sac.   Mild bilateral C5-C6 uncovertebral joint arthropathy without significant neural foraminal stenosis.     MACRO: None.   Signed by: Percy Singleton 9/29/2024 7:59 AM Dictation workstation:   [id number]    MR thoracic spine w and wo IV contrast    Result Date: 8/20/2024  Interpreted By:  Ti Vidales, STUDY: MR THORACIC SPINE W AND WO IV CONTRAST;  8/19/2024 2:45 pm   INDICATION: Signs/Symptoms:paresthesia,.   COMPARISON: None.   ACCESSION NUMBER(S): [id number]   ORDERING CLINICIAN: KELLY GREEN   TECHNIQUE: Sagittal and axial T1 and T2 weighted images were performed through the thoracic spine.   FINDINGS: Marrow signal and vertebral body height are normal. There is slight loss of height and signal intervertebral disc spaces. Small bulging discs and osteophytes are present throughout the thoracic spine. There is no measurable canal stenosis or foraminally narrowing. The spinal cord is normal in size and signal. There is no evidence of herniated nucleus polyposis. The conus is normal. The paraspinal soft tissues are normal.       Thoracic spondylosis without canal or foraminal  narrowing.   MACRO: none   Signed by: Ti Vidales 8/20/2024 7:47 AM Dictation workstation:   [id number]    MR cervical spine w and wo IV contrast    Result Date: 8/20/2024  Interpreted By:  Ti Vidales, STUDY: MR CERVICAL SPINE W AND WO IV CONTRAST;  8/19/2024 2:45 pm   INDICATION: Signs/Symptoms:paresthsias.   COMPARISON: None.   ACCESSION NUMBER(S): [id number]   ORDERING CLINICIAN: KELLY GREEN   TECHNIQUE: The cervical spine was studied in the sagittal and axial planes utilizing T1 and T2 weighted images.   FINDINGS: The craniovertebral junction is normal. The cord is normal in size and signal. The marrow signal is normal. Serial axial images reveal the following: C2/C3 There is normal alignment and vertebral body height. The disc space is normal. There is no evidence of canal or foraminal narrowing. There is no evidence of bulging or herniated disc. C3/C4 There is normal alignment and vertebral body height. The disc space is normal. There is no evidence of canal or foraminal narrowing. There is no evidence of bulging or herniated disc. C4/C5 There is normal alignment and vertebral body height. The disc space is normal. There is no evidence of canal or foraminal narrowing. There is no evidence of bulging or herniated disc. C5/C6 Left paracentral herniated nucleus polyposis measuring a proximally 1 cm in size with slight flattening of the thecal sac but without measurable canal stenosis. No associated foraminal narrowing. C6/C7 Mild uncovertebral joint hypertrophy or asymmetrical bulging disc to the left with trace foraminal narrowing. No associated canal stenosis C7/T1 There is normal alignment and vertebral body height. The disc space is normal. There is no evidence of canal or foraminal narrowing. There is no evidence of bulging or herniated disc.       * Herniated nucleus polyposis to the left at C5/C6   MACRO: none   Signed by: Ti Vidales 8/20/2024 7:46 AM Dictation workstation:    [id number]     Assessment/Plan   43 years old with history and physical examination supportive of cervical disc displacement with cervical radiculopathy tried and failed conservative management with physical therapy and nonsteroidal anti-inflammatory    Plan  Knowing that the pain has persisted for multiple years and has been persistent despite the conservative management and the patient has a cervical disc herniation I would recommend a cervical epidural steroid injection to be performed under fluoroscopic guidance targeting the C7-T1 or the C6-C7 level with injection of contrast material to confirm needle placement I will reevaluate the patient after the performance of the epidural for further recommendation as her case progress benefits and risk were discussed and she would like to proceed      The above clinical summary has been dictated with voice recognition software. It has not been proofread for grammatical errors, typographical mistakes, or other semantic inconsistencies.    Thank you for visiting our office today. It was our pleasure to take part in your healthcare.     Please do not hesitate to contact the pain clinic after your visit with any questions or concerns at  M-F 8-4 pm       Brenda Kapoor M.D.  Medical Director , Division of Pain Medicine [facility]   of Anesthesiology and Pain Medicine  [employer]     [place] [address] [address] [place] [address]     Office: [phone]  Fax: [phone]      Brenda Kapoor MD

## 2024-10-18 NOTE — H&P (VIEW-ONLY)
History Of Present Illness  Caro Dewitt is a 43 y.o. female presenting for chronic neck pain. Has seen pain management in the past. Receiving injections. Referred by Dr Brown who is planning neck surgery but is required by insurance to see pain management and PT first. Is participating in PT.Is taking motrin for the pain at home.   Has been complaining of the symptoms for the last 15 years they radiate from the neck area down to her shoulder bilaterally and to the front of her chest area usually the left side is worse than the right side.  Rating the pain at best at 3 and a worse at 9 out of 10 aggravated by major physical activity did not describe any alleviating factors has been taking Motrin on as needed basis with some help .  Describing the pain as a burning stabbing sensation  Past Medical History  Past Medical History:   Diagnosis Date    Depression      Patient Active Problem List   Diagnosis    Panic disorder    Recurrent major depressive disorder, in remission (CMS-HCC)    Anxiety and depression    ADHD, predominantly inattentive type    Mixed hyperlipidemia    Polyp of cervix    Hypotension    Goiter    Fibromyalgia    Angioedema    Anti-TPO antibodies present    Prediabetes    CHANDRAKANT (generalized anxiety disorder)    Agoraphobia    Cervical spinal stenosis    Cervical spondylosis with myelopathy    Upper extremity weakness       Surgical History  No past surgical history on file.    Social History  She reports that she has been smoking cigarettes. She has never been exposed to tobacco smoke. She has never used smokeless tobacco. She reports that she does not currently use alcohol after a past usage of about 1.0 standard drink of alcohol per week. She reports that she does not use drugs.    Family History  Family History   Problem Relation Name Age of Onset    Alcohol abuse Mother Mom     Depression Mother Mom     Alcohol abuse Sister Sister     Depression Sister Sister     Alcohol abuse Brother Brother      Depression Brother Brother         Allergies  Allergies   Allergen Reactions    Adhesive Hives and Swelling     Review of Systems   12 Systems have been reviewed as follows.   Constitutional: Fever, weight gain, weight loss, appetite change, night sweats, fatigue, chills.  Eyes : blurry, double vision, vision, loss, tearing, redness, pain, sensitivity to light, glaucoma.  Ears, nose, mouth, and throat: Hearing loss, ringing in the ears, ear pain, nasal congestion, nasal drainage, nosebleeds, mouth, throat, irritation tooth problem.  Cardiovascular :chest pain, pressure, heart tracing,palpaitations , sweating, leg swelling, high or low blood pressure  Pulmonary: Cough, yellow or green sputum, blood and sputum, shortness of breath, wheezing  Gastrointestinal: Nause, vomiting, diarrhea, constipation, pain, blood in stool, or vomitus, heartburn, difficulty swallowing  Genitourinary: incontinence, abnormal bleeding, abnormal discharge, urinary frequency, urinary hesitancy, pain, impotence sexual problem, infection, urinary retention  Musculoskeletal: Pain, stiffness, joint, redness or warmth, arthritis, back pain, weakness, muscle wasting, sprain or fracture  Neuro: Weight weakness, dizziness, change in voice, change in taste change in vision, change in hearing, loss, or change of sensation, trouble walking, balance problems coordination problems, shaking, speech problem  Endocrine , cold or heat intolerance, blood sugar problem, weight gain or loss missed periods hot flashes, sweats, change in body hair, change in libido, increased thirst, increased urination  Heme/lymph: Swelling, bleeding, problem anemia, bruising, enlarged lymph nodes  Allergic/immunologic: H. plus nasal drip, watery itchy eyes, nasal drainage, immunosuppressed  The above, were reviewed and noted negative except as noted.     Physical Exam   Vital signs reviewed, documented in chart     General:  Appears well, does not look in any major  distress  Alert    HEENT:  Head atraumatic  Eyes normal inspection  PERRL  Normal ENT inspection  No signs of dehydration    NECK:  Normal inspection  Range of motion within normal     RESPIRATORY:  No respiratory distress    CVS:  Heart rate and rhythm regular    ABDOMEN/GI  Soft  Non-tender  No distention  No organomegaly       EXTREMITIES:  Non-Tender  Full ROM  Normal appearance  No Pedal edema  Power symmetrical , sensory examination preserved.    NEURO:  Alert and oriented X 3  CNS normal as tested without focal neurological deficit   Sensation normal  Motor normal  reflexes normal    PSYCH:  Mood normal  Affect normal    SKIN:  Color normal  No rash  Warm  Dry  no sign of skin marking supportive of IV drug usage /abuse.     Last Recorded Vitals  Blood pressure 109/65, pulse 86, temperature 35.6 °C (96.1 °F), resp. rate 18, SpO2 98%, not currently breastfeeding.  CT cervical spine wo IV contrast    Result Date: 9/29/2024  Interpreted By:  Percy Singleton, STUDY: CT CERVICAL SPINE WO IV CONTRAST;  9/27/2024 4:09 pm   INDICATION: Signs/Symptoms:understand bony anatomy and r/o OPLL.   ,M48.02 Spinal stenosis, cervical region,M47.12 Other spondylosis with myelopathy, cervical region   COMPARISON: None.   ACCESSION NUMBER(S): [id number]   ORDERING CLINICIAN: CYNTHIA LAWSON   TECHNIQUE: Axial noncontrast images of the cervical spine with coronal and sagittal reconstructed images.   FINDINGS: PREVERTEBRAL SOFT TISSUES: Within normal limits.   CRANIOCERVICAL JUNCTION: Intact.   ALIGNMENT: Straightening of cervical lordosis, likely positional. No traumatic malalignment or traumatic facet widening.   VERTEBRAE:  No acute fracture. Vertebral body heights are maintained.   SPINAL CANAL/INTERVERTEBRAL DISCS/NEURAL FORAMINA:   C2-C3:  No significant disc protrusion, canal stenosis, or foraminal stenosis.   C3-C4: Small central disc protrusion without significant canal stenosis. No significant neural foraminal stenosis.    C4-C5: No significant disc protrusion, canal stenosis, or foraminal stenosis.   C5-C6: Mild disc height loss. Small broad-based disc spur complex resulting in effacement of the ventral thecal sac and abuts the ventral cord surface resulting in mild-moderate canal stenosis. There is mild bilateral uncovertebral joint arthropathy without significant neural foraminal stenosis.   C6-C7: Small central disc protrusion with an eccentric right disc-spur complex component slightly indents the ventral thecal sac. No significant canal stenosis or neural foraminal stenosis.   C7-T1:  No significant disc protrusion, canal stenosis, or foraminal stenosis.     OTHER: No significant abnormality.       Soft disc protrusion at C3-C4 and disc spur complexes at C5-C6 and C6-C7, most pronounced at C5-C6 resulting in effacement of the ventral thecal sac.   Mild bilateral C5-C6 uncovertebral joint arthropathy without significant neural foraminal stenosis.     MACRO: None.   Signed by: Percy Singleton 9/29/2024 7:59 AM Dictation workstation:   [id number]    MR thoracic spine w and wo IV contrast    Result Date: 8/20/2024  Interpreted By:  Ti Vidales, STUDY: MR THORACIC SPINE W AND WO IV CONTRAST;  8/19/2024 2:45 pm   INDICATION: Signs/Symptoms:paresthesia,.   COMPARISON: None.   ACCESSION NUMBER(S): [id number]   ORDERING CLINICIAN: KELLY GREEN   TECHNIQUE: Sagittal and axial T1 and T2 weighted images were performed through the thoracic spine.   FINDINGS: Marrow signal and vertebral body height are normal. There is slight loss of height and signal intervertebral disc spaces. Small bulging discs and osteophytes are present throughout the thoracic spine. There is no measurable canal stenosis or foraminally narrowing. The spinal cord is normal in size and signal. There is no evidence of herniated nucleus polyposis. The conus is normal. The paraspinal soft tissues are normal.       Thoracic spondylosis without canal or foraminal  narrowing.   MACRO: none   Signed by: Ti Vidales 8/20/2024 7:47 AM Dictation workstation:   [id number]    MR cervical spine w and wo IV contrast    Result Date: 8/20/2024  Interpreted By:  Ti Vidales, STUDY: MR CERVICAL SPINE W AND WO IV CONTRAST;  8/19/2024 2:45 pm   INDICATION: Signs/Symptoms:paresthsias.   COMPARISON: None.   ACCESSION NUMBER(S): [id number]   ORDERING CLINICIAN: KELLY GREEN   TECHNIQUE: The cervical spine was studied in the sagittal and axial planes utilizing T1 and T2 weighted images.   FINDINGS: The craniovertebral junction is normal. The cord is normal in size and signal. The marrow signal is normal. Serial axial images reveal the following: C2/C3 There is normal alignment and vertebral body height. The disc space is normal. There is no evidence of canal or foraminal narrowing. There is no evidence of bulging or herniated disc. C3/C4 There is normal alignment and vertebral body height. The disc space is normal. There is no evidence of canal or foraminal narrowing. There is no evidence of bulging or herniated disc. C4/C5 There is normal alignment and vertebral body height. The disc space is normal. There is no evidence of canal or foraminal narrowing. There is no evidence of bulging or herniated disc. C5/C6 Left paracentral herniated nucleus polyposis measuring a proximally 1 cm in size with slight flattening of the thecal sac but without measurable canal stenosis. No associated foraminal narrowing. C6/C7 Mild uncovertebral joint hypertrophy or asymmetrical bulging disc to the left with trace foraminal narrowing. No associated canal stenosis C7/T1 There is normal alignment and vertebral body height. The disc space is normal. There is no evidence of canal or foraminal narrowing. There is no evidence of bulging or herniated disc.       * Herniated nucleus polyposis to the left at C5/C6   MACRO: none   Signed by: Ti Vidales 8/20/2024 7:46 AM Dictation workstation:    [id number]     Assessment/Plan   43 years old with history and physical examination supportive of cervical disc displacement with cervical radiculopathy tried and failed conservative management with physical therapy and nonsteroidal anti-inflammatory    Plan  Knowing that the pain has persisted for multiple years and has been persistent despite the conservative management and the patient has a cervical disc herniation I would recommend a cervical epidural steroid injection to be performed under fluoroscopic guidance targeting the C7-T1 or the C6-C7 level with injection of contrast material to confirm needle placement I will reevaluate the patient after the performance of the epidural for further recommendation as her case progress benefits and risk were discussed and she would like to proceed      The above clinical summary has been dictated with voice recognition software. It has not been proofread for grammatical errors, typographical mistakes, or other semantic inconsistencies.    Thank you for visiting our office today. It was our pleasure to take part in your healthcare.     Please do not hesitate to contact the pain clinic after your visit with any questions or concerns at  M-F 8-4 pm       Brenda Kapoor M.D.  Medical Director , Division of Pain Medicine [facility]   of Anesthesiology and Pain Medicine  [facility]     [place] [address] [address] [place] [address]     Office: [phone]  Fax: [phone]      Brenda Kapoor MD

## 2024-10-18 NOTE — PROGRESS NOTES
Here for chronic neck pain.  Has seen pain management in the past. Receiving injections. Referred by Dr Hotra who is planning neck surgery but is required by insurance to see pain management and PT first. Is participating in PT.Is taking motrin for the pain at home.

## 2024-10-21 ENCOUNTER — TREATMENT (OUTPATIENT)
Dept: PHYSICAL THERAPY | Facility: CLINIC | Age: 44
End: 2024-10-21
Payer: COMMERCIAL

## 2024-10-21 DIAGNOSIS — R29.898 UPPER EXTREMITY WEAKNESS: Primary | ICD-10-CM

## 2024-10-21 DIAGNOSIS — M47.12 CERVICAL SPONDYLOSIS WITH MYELOPATHY: ICD-10-CM

## 2024-10-21 DIAGNOSIS — M48.02 CERVICAL SPINAL STENOSIS: ICD-10-CM

## 2024-10-21 PROCEDURE — 97140 MANUAL THERAPY 1/> REGIONS: CPT | Mod: GP | Performed by: PHYSICAL THERAPIST

## 2024-10-21 PROCEDURE — 97110 THERAPEUTIC EXERCISES: CPT | Mod: GP | Performed by: PHYSICAL THERAPIST

## 2024-10-21 ASSESSMENT — PAIN - FUNCTIONAL ASSESSMENT: PAIN_FUNCTIONAL_ASSESSMENT: 0-10

## 2024-10-28 ENCOUNTER — TREATMENT (OUTPATIENT)
Dept: PHYSICAL THERAPY | Facility: CLINIC | Age: 44
End: 2024-10-28
Payer: COMMERCIAL

## 2024-10-28 DIAGNOSIS — M48.02 CERVICAL SPINAL STENOSIS: ICD-10-CM

## 2024-10-28 DIAGNOSIS — R29.898 UPPER EXTREMITY WEAKNESS: Primary | ICD-10-CM

## 2024-10-28 DIAGNOSIS — F41.0 PANIC DISORDER: ICD-10-CM

## 2024-10-28 DIAGNOSIS — M47.12 CERVICAL SPONDYLOSIS WITH MYELOPATHY: ICD-10-CM

## 2024-10-28 PROCEDURE — 97140 MANUAL THERAPY 1/> REGIONS: CPT | Mod: GP | Performed by: PHYSICAL THERAPIST

## 2024-10-28 PROCEDURE — 97110 THERAPEUTIC EXERCISES: CPT | Mod: GP | Performed by: PHYSICAL THERAPIST

## 2024-10-28 RX ORDER — CLONAZEPAM 0.5 MG/1
0.5 TABLET, ORALLY DISINTEGRATING ORAL 2 TIMES DAILY
Qty: 60 TABLET | Refills: 1 | Status: SHIPPED | OUTPATIENT
Start: 2024-10-28

## 2024-10-28 ASSESSMENT — PAIN SCALES - GENERAL: PAINLEVEL_OUTOF10: 4

## 2024-10-28 ASSESSMENT — PAIN - FUNCTIONAL ASSESSMENT: PAIN_FUNCTIONAL_ASSESSMENT: 0-10

## 2024-11-11 ENCOUNTER — APPOINTMENT (OUTPATIENT)
Dept: BEHAVIORAL HEALTH | Facility: CLINIC | Age: 44
End: 2024-11-11
Payer: COMMERCIAL

## 2024-11-11 DIAGNOSIS — F33.40 RECURRENT MAJOR DEPRESSIVE DISORDER, IN REMISSION (CMS-HCC): ICD-10-CM

## 2024-11-11 DIAGNOSIS — F41.1 GAD (GENERALIZED ANXIETY DISORDER): ICD-10-CM

## 2024-11-11 DIAGNOSIS — F40.00 AGORAPHOBIA: ICD-10-CM

## 2024-11-11 DIAGNOSIS — F90.0 ADHD, PREDOMINANTLY INATTENTIVE TYPE: ICD-10-CM

## 2024-11-11 DIAGNOSIS — F41.0 PANIC DISORDER: ICD-10-CM

## 2024-11-11 PROCEDURE — 99214 OFFICE O/P EST MOD 30 MIN: CPT

## 2024-11-11 NOTE — PROGRESS NOTES
"An interactive audio and video telecommunication system which permits real time communications between the patient (at the originating site) and provider (at the distant site) was utilized to provide this telehealth service.   Verbal consent was requested and obtained from Caro Dewitt on this date, 24 for a telehealth visit.     HPI  Caro Dewitt is a 44 y.o. female patient with a CC ADHD, Panic Attack, Anxiety, MDD (Major Depressive Disorder), and Sleeping Problem presenting to outpatient treatment for a scheduled psych outpatient psychiatric evaluation.   Pt identify self by name, , and address     2024  Reports feeling hopeless since visit even after Trintellix dose adjustment. States nothing has changed. States she doesn't \"feel a reason for existing,\" but denies SI. Energy and motivation is significantly low. Only takes Klonopin when needed around the evening times because of drowsy effects. \"Want to sleep all the time but can't sleep.\" Appetite is good. Denies SI/HI/doni/hypomania/delusions/hallucinations. Attention is still low on current dose of Vyvanse. Denies any panic attacks. Stress of work/life/home seems to be contributing to her symptoms. Denies mood swings.     Current S/Sx:  -Mood swings: stable  -Depressive mood: \"very anhedonic, zero pleasure out of anything. PHQ-9 (15)  -Fatigue/Energy: low  -Feeling hope/help/worthless: denies  -Sleep: sleep varies 5 - 12 hours with occasional naps.   -Motivation: low  -Appetite/Weight Changes: good appetite, no weight changes  -Psychosis: denies hallucinations/delusions  -SI/HI: Denies current suicidal intent/plan  -Guns/Weapons at home: denies     -Worry excessively: present, impactful  -Difficulty controlling worry: present, impactful  -Easily fatigued d/t worry: present, impactful  -Poor concentration d/t worry: present, impactful  -Sleep disturbance d/t worry: present, impactful  -Easy irritability d/t worry: present, " impactful  -Restless / feeling on edge d/t worry: denies  -CHANDRAKANT (14)     Panic attacks  Onset: ~20 yrs ago, duration: until the she can get out of the situation, frequency: about 5 times/week, associated s/sx: sweaty, feeling like passing out, SOB, palpitation, shaky, relieving factors: medication, sleep, precipitating factors: fear of leaving the house, driving, social, last one: this morning     HISTORY  PSYCH HISTORY  -Psych Hx:  CHANDRAKANT with panic attacks, depression, and ADHD, and agoraphobia.  -Psych Hospitalization Hx: denies  -Suicide Attempt Hx: denies  -Self-Harm/Violence Hx: denies  -Current psych meds: Celexa 40 mg (not helpful since starting 5 yrs ago), Vyvanse 40 mg daily, Klonopin 0.5 mg BID as needed for panic attacks (takes daily), Abilify 2 mg daily (2-3 yrs now), Hydroxyzine 50 mg at bedtime  -Psych Med Hx: Zoloft (ineffective), Wellbutrin (ineffective), Prozac (ineffective), Effexor (suicidal)     SUBSTANCE USE HISTORY  -Substance Use Hx: denies  -ETOH: rarely  -Tobacco: 1 ppd (since high school off/on)  -Caffeine: 2 - 4 coffee cups/day  -Substance Abuse Treatment Hx: denies     FAMILY HISTORY  -Family Psych Hx: both sides (mostly maternal side): anxiety, depression, ADHD etc  -Family Suicide Hx: mom, brother, sister - attempted several times  -Family Substance Abuse Hx: mom, brother, sister - alcoholism     SOCIAL HISTORY  -Upbringing: Grew up with both parents,  at a young age. Has brother/sister  -income: no issues  -Support system: good  -Trauma: emotional and sexual  -Education: bachelors  -Work: Sr.  of Research at [employer]  -Marital Status: Divorce proceedings with , in a relationship  -Children: 2 girls  -Living situation: lives with 2 daughters, and a dog  -: denies  -Legal: denies      MEDICAL HISTORY  -PCP: FERCHO Quezada-CNP  -TBI/head trauma/LOC/seizure hx: denies     REVIEW OF SYSTEMS  Review of Systems   Constitutional:          Pain for 15 yrs, MRI Brain, thoracic spine, Cervical spine   All other systems reviewed and are negative.       PHYSICAL EXAM  Physical Exam  Psychiatric:         Attention and Perception: Attention and perception normal.         Mood and Affect: Affect normal. Mood is anxious.         Speech: Speech normal.         Behavior: Behavior normal. Behavior is cooperative.         Thought Content: Thought content normal.         Cognition and Memory: Cognition and memory normal.         Judgment: Judgment normal.        IMPRESSION  ADHD, Panic Attack, Anxiety, MDD (Major Depressive Disorder), and Sleeping Problem    Notes anxiety remains controlled with bouts of elevation but depressive episodes remain significantly present and impactful on current dose of Trintellix.  Reports ongoing struggle with low energy/motivation/apathy/anhedonia. Notes stable mood.  Focus/attention/memory/concentration remain unstable on current dose of Vyvanse. No hallucinations/delusion/doni/hypomania/SI reported. Appetite and sleep are good. No side effects or substance use concerns at this time.  Discussed to increase Trintellix to further improve depressive symptoms, may switch to Viibryd if Trintellix remains ineffective on current dose.  R    SI/HI ASSESSMENT  -Risk Assessment: Caro Dewitt is currently a low acute risk of suicide and self-harm due to no past suicide attempt(s) and not currently endorsing thoughts of suicide.   -Suicidal Risk Factors: , history of trauma/abuse, chronic pain, current psychiatric illness, feelings of hopelessness, and panic attacks  -Protective Factors: strong coping skills, sense of responsibility towards family, positive family relationships, hopefulness/future orientation, marriage/partnership, and employment  -Plan to Reduce Risk: increase coping skills .     PLAN  Reviewed diagnostic impression including subjective and objective data and provided education about Panic attacks, MDD, CHANDRAKANT,  ADHD, and Sleep disturbance, etiology, treatment recommendations including medication, therapy, course of treatment and prognosis. Patient amenable to treatment plan.      Dx: ADHD, Panic Attack, Anxiety, MDD (Major Depressive Disorder), and Sleeping Problem  INCREASE Trintellix 20 mg daily   CONTINUE Abilify 5 mg daily  CONTINUE Vyvanse 50 mg daily (enough refills until 12/13/24)  CONTINUE Hydroxyzine 50 mg at bedtime  CONTINUE Klonopin 0.5 mg BID as needed for panic attacks/severe anxiety (refill Pending UDS)     Reviewed r/b/a, possible side effects of the medication. Client is aware about the benefit outweighs the risk.     Psychotherapy: tried several times but didn't have success    Labs reviewed     OARRS  I have personally reviewed the OARRS report for Caro Dewitt. I have considered the risks of abuse, dependence, addiction and diversion. Last filled Vyvanse 50 mg on 10/28/2024 (30 caps x 30 days), Klonopin 0.5 mg on 10/30/2024 (60 tabs x 30 days)    Last Urine Drug Screen  Date of Last Screen: 10/03/2023, UDS ordered    Controlled Substance Agreement:  Date of the Last Agreement: 5/7/2024 for Stimulants, 10/7/2024  for bzds  Reviewed Controlled Substance Agreement including but not limited to the benefits, risks, and alternatives to treatment with a Controlled Substance medication(s).      -Follow-up with this provider in 3 weeks.    - Follow up with physical health providers as scheduled  -Risks/benefits/assessment of medication interventions discussed with pt; pt agreeable to plan. Will continue to monitor for symptoms mgmt and SEs and adjust plan as needed.  -MI to increase coping skills/behavior regulation.  -Safety plan reviewed.  -Call  Psychiatry at [phone] with issues.  -For [facility] residents, [employer] is a 24/7 hotline you can call for assistance at [phone]. Please call 911 or go to your closest Emergency Room if you feel worse. This includes thoughts of hurting  yourself or anyone else, or having other troubles such as hearing voices, seeing visions, or having new and scary thoughts about the people around you.

## 2024-11-13 ENCOUNTER — OFFICE VISIT (OUTPATIENT)
Dept: PRIMARY CARE | Facility: CLINIC | Age: 44
End: 2024-11-13
Payer: COMMERCIAL

## 2024-11-13 ENCOUNTER — TELEPHONE (OUTPATIENT)
Dept: PRIMARY CARE | Facility: CLINIC | Age: 44
End: 2024-11-13

## 2024-11-13 VITALS
RESPIRATION RATE: 16 BRPM | HEART RATE: 84 BPM | OXYGEN SATURATION: 98 % | TEMPERATURE: 98.1 F | DIASTOLIC BLOOD PRESSURE: 66 MMHG | WEIGHT: 147 LBS | BODY MASS INDEX: 24.49 KG/M2 | HEIGHT: 65 IN | SYSTOLIC BLOOD PRESSURE: 93 MMHG

## 2024-11-13 DIAGNOSIS — Z01.818 PRE-OP EXAM: Primary | ICD-10-CM

## 2024-11-13 DIAGNOSIS — J45.20 MILD INTERMITTENT REACTIVE AIRWAY DISEASE WITHOUT COMPLICATION (HHS-HCC): ICD-10-CM

## 2024-11-13 DIAGNOSIS — M54.41 CHRONIC BILATERAL LOW BACK PAIN WITH BILATERAL SCIATICA: Primary | ICD-10-CM

## 2024-11-13 DIAGNOSIS — T78.3XXD ANGIOEDEMA, SUBSEQUENT ENCOUNTER: ICD-10-CM

## 2024-11-13 DIAGNOSIS — G89.29 CHRONIC BILATERAL LOW BACK PAIN WITH BILATERAL SCIATICA: Primary | ICD-10-CM

## 2024-11-13 DIAGNOSIS — M54.42 CHRONIC BILATERAL LOW BACK PAIN WITH BILATERAL SCIATICA: Primary | ICD-10-CM

## 2024-11-13 PROBLEM — J45.909 REACTIVE AIRWAY DISEASE WITHOUT COMPLICATION (HHS-HCC): Status: ACTIVE | Noted: 2024-11-13

## 2024-11-13 PROCEDURE — 99213 OFFICE O/P EST LOW 20 MIN: CPT | Performed by: NURSE PRACTITIONER

## 2024-11-13 PROCEDURE — 93000 ELECTROCARDIOGRAM COMPLETE: CPT | Performed by: NURSE PRACTITIONER

## 2024-11-13 PROCEDURE — 4004F PT TOBACCO SCREEN RCVD TLK: CPT | Performed by: NURSE PRACTITIONER

## 2024-11-13 PROCEDURE — 3008F BODY MASS INDEX DOCD: CPT | Performed by: NURSE PRACTITIONER

## 2024-11-13 RX ORDER — BUDESONIDE AND FORMOTEROL FUMARATE DIHYDRATE 80; 4.5 UG/1; UG/1
2 AEROSOL RESPIRATORY (INHALATION)
Qty: 10.2 G | Refills: 5 | Status: SHIPPED | OUTPATIENT
Start: 2024-11-13 | End: 2025-11-13

## 2024-11-13 ASSESSMENT — ENCOUNTER SYMPTOMS
ALLERGIC/IMMUNOLOGIC NEGATIVE: 1
CONSTITUTIONAL NEGATIVE: 1
WHEEZING: 1
EYES NEGATIVE: 1
GASTROINTESTINAL NEGATIVE: 1
CARDIOVASCULAR NEGATIVE: 1
NEUROLOGICAL NEGATIVE: 1
MUSCULOSKELETAL NEGATIVE: 1
PSYCHIATRIC NEGATIVE: 1
HEMATOLOGIC/LYMPHATIC NEGATIVE: 1
ENDOCRINE NEGATIVE: 1

## 2024-11-13 NOTE — PROGRESS NOTES
Patient Name: Caro Dewitt  MRN: [id number]  Today's Date: 11/15/2024  Time Calculation  Start Time: 1436  Stop Time: 1519  Time Calculation (min): 43 min     Diagnosis:  Problem List Items Addressed This Visit             ICD-10-CM    Cervical spinal stenosis M48.02    Cervical spondylosis with myelopathy M47.12    Upper extremity weakness R29.898       Insurance:  4/20 2024 // 0% coins, $300 / $900 ded ($300 / $600 met), $4000 / $12730 oop ($1508.91 / $2380.77 met), $25 copay, 60v per marsha yr (0v used as of 10/7/2024), no PA  [employer]  91346 - 72139 payable once every 180 days, 13680 payable once every 30 days  42619 Ultrasound not covered  Limited to 4 timed codes per visit  Referral auth# [id number] for evaluation.  10/7/24 thru 10/7/25.  M47.12, M48.02.  50555, 02174, 18701, 36886, 60134  POC: 10.16.24 - 1.14.25  Visit # 4    Precautions:  Precautions  STEADI Fall Risk Score (The score of 4 or more indicates an increased risk of falling): +-  Precautions Comment: no falls    Subjective:  Patient had her epidural yesterday.  She has not noticed in decrease in pain but reports an increase in spasms in L mid back.  She has noted that her posture has improved and she is not constantly shrugging her shoulders.   Pain:  Pain Assessment: 0-10  0-10 (Numeric) Pain Score: 6   Location: SOC, interscapular   Description: tight    Objective:  Mod/SVR STR - B thoracic paraspinals, R SOC    Outcome Measure:  nt    Treatment:  There ex 57234: 8/1  Prone scapular retraction x 5  Not today   Standing  scapular retraction/rows  x 10 RTB   Standing shoulder extension rows x 10 GTB   Seated neck retraction extension x 10 GTB  Seated post shoulder circles x 10  Supine horiz abduction GTB x 10  SA punches x 10      Manual therapy 62737: 35/2  STM, MFR, TrP!  Supine  SOC, UT, cervical distraction  In sidelying, supine  UT, levator, interscapular, pec minor, scapular lifting as able  Prone:  G3-4 Thoracic PA  mobs    HEP/Education:  Cont with postural awareness    Assessment:  Patient tolerated today's session well.  Focus this date was on decreasing her mod/severe STR which are impacting her pain level and quality of life.  Patient demonstrated improved upright posture and awareness with self correcting but ongoing weakness and STR in thoracic spine.  Patient cont to require additional skilled PT services for patient education and progression in there ex/HEP, manual therapy to address the STR, and nmmreed and DN as needed and in order to achieve the patient's and PT goal.  Patient demonstrates a working understanding of treatment plan and HEP requirements.    Plan:  Progress with functional strength as tolerated with respect to tissue healing. Manual therapy PRN to improve/maintain ROM, prevent adhesion, reduce pain.

## 2024-11-13 NOTE — TELEPHONE ENCOUNTER
[facility] requested a   Signed EKG with tracing and report (this was already faxed)      Please forward results of CBC as soon as they are received      Fax:  [phone]    Any questions please contact Elle at [phone]

## 2024-11-13 NOTE — PROGRESS NOTES
"Subjective   Caro Dewitt is a 44 y.o. female who presents for Pre-admit Consultaion.  HPI  Review of Systems    Objective   Physical Exam  BP 93/66 (BP Location: Left arm, Patient Position: Sitting, BP Cuff Size: Small adult)   Pulse 84   Temp 36.7 °C (98.1 °F) (Temporal)   Resp 16   Ht 1.651 m (5' 5\")   Wt 66.7 kg (147 lb)   SpO2 98%   BMI 24.46 kg/m²       Assessment/Plan   Problem List Items Addressed This Visit    None        "

## 2024-11-13 NOTE — PROGRESS NOTES
Subjective   Caro Dewitt is a 44 y.o. female who presents to the office today for a preoperative consultation at the request of surgeon Safia Stubbs- and epidural injection tomorrow.  who plans on performing  on November 26. This consultation is requested for the specific conditions prompting preoperative evaluation (i.e. because of potential affect on operative risk). Planned anesthesia is epidural and general. The patient has the following known anesthesia issues:  none . Patient has a bleeding risk of:  no history of blood clots or abnormal bleeding . Patient does not have objections to receiving blood products if needed. Going to do C4-C7. Keeping one night- going to stay at her moms- 12 weeks off of work. Has a lot of arm numbness and tingling.     Neurosurgeon will not do the surgery unless she has no cigarettes can't do NRT either. Wanted her 6 weeks off nicotene.   She did see Rheumatology at the [facility].     Review of Systems  Review of Systems   Constitutional: Negative.   HENT: Negative.     Eyes: Negative.    Cardiovascular: Negative.    Respiratory:  Positive for wheezing (slight with inspiration).    Endocrine: Negative.    Hematologic/Lymphatic: Negative.    Skin: Negative.    Musculoskeletal: Negative.    Gastrointestinal: Negative.    Genitourinary: Negative.    Neurological: Negative.    Psychiatric/Behavioral: Negative.     Allergic/Immunologic: Negative.    All other systems reviewed and are negative.       Objective   Physical Exam  HENT:      Head: Normocephalic.      Comments: Mallampati II      Right Ear: Hearing, ear canal and external ear normal.      Left Ear: Hearing, ear canal and external ear normal.      Nose: Nose normal.    Mouth/Throat:      Lips: Pink. No lesions.      Mouth: Mucous membranes are moist. No injury, lacerations, oral lesions or angioedema.      Dentition: Normal dentition. No dental tenderness, gingival swelling, dental caries, dental abscesses or gum  "lesions.      Tongue: No lesions. Tongue does not deviate from midline.      Palate: No mass and lesions.      Pharynx: Oropharynx is clear. Uvula midline. No pharyngeal swelling, oropharyngeal exudate, posterior oropharyngeal erythema, uvula swelling or postnasal drip.      Tonsils: No tonsillar exudate or tonsillar abscesses. 0 on the right. 0 on the left.   Pulmonary:      Breath sounds: Wheezing (slight inspiratory) present.   Cardiovascular:      PMI at left midclavicular line. Normal rate. Regular rhythm. Normal S1. Normal S2.       Murmurs: There is no murmur.      No gallop.  No click. No rub.   Edema:     Peripheral edema absent.       BP 93/66 (BP Location: Left arm, Patient Position: Sitting, BP Cuff Size: Small adult)   Pulse 84   Temp 36.7 °C (98.1 °F) (Temporal)   Resp 16   Ht 1.651 m (5' 5\")   Wt 66.7 kg (147 lb)   SpO2 98%   BMI 24.46 kg/m²     Predictors of intubation difficulty:  Morbid obesity? no  Anatomically abnormal facies? no  Prominent incisors? no  Receding mandible? no  Short, thick neck? no  Neck range of motion: normal  Mallampati score: II (hard and soft palate, upper portion of tonsils and uvula visible)  Thyromental distance: < 6cm  Dentition: No chipped, loose, or missing teeth.    Cardiographics  ECG: normal sinus rhythm, no blocks or conduction defects, no ischemic changes  Echocardiogram: not done    Imaging  Chest x-ray:  doesn't need       Lab Review   Lab on 08/19/2024   Component Date Value    Cholesterol 08/19/2024 229 (H)     HDL-Cholesterol 08/19/2024 44.7     Cholesterol/HDL Ratio 08/19/2024 5.1     LDL Calculated 08/19/2024 141 (H)     VLDL 08/19/2024 43 (H)     Triglycerides 08/19/2024 217 (H)     Non HDL Cholesterol 08/19/2024 184 (H)     Hemoglobin A1C 08/19/2024 5.6     Estimated Average Glucose 08/19/2024 114     AChR Blocking Abs, Serum 08/19/2024 4     Acetylchol Modul Ab 08/19/2024 0     AChR Binding Ab, Serum 08/19/2024 0.0         Assessment/Plan   44 " y.o. female with planned surgery as above.    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   Difficulty with intubation is not anticipated.    Cardiac Risk Estimation: per the Revised Cardiac Risk Index , the patient's risk factors for cardiac complications include  normal risk , putting her in: RCI RISK CLASS I (0 risk factors, risk of major cardiac compl. appr. 0.5%)    Current medications which may produce withdrawal symptoms if withheld perioperatively: clonazepam- will need to resume. Vortioexetine- 20 mg resume after surgery.     1. Preoperative workup as follows ECG.  2. Change in medication regimen before surgery: discontinue ASA 14 days before surgery, discontinue NSAIDs (ibuprofen, aleve, motrin) 14 days before surgery, and discontinue Metformin 24 hours before surgery.  3. Prophylaxis for cardiac events with perioperative beta-blockers: not indicated.  4. Invasive hemodynamic monitoring perioperatively: at the discretion of anesthesiologist.  5. Deep vein thrombosis prophylaxis postoperatively:regimen to be chosen by surgical team.  6. Surveillance for postoperative MI with ECG immediately postoperatively and on postoperative days 1 and 2 AND troponin levels 24 hours postoperatively and on day 4 or hospital discharge (whichever comes first): not indicated.  7. Other measures: Preoperative alcohol abstention x 30 days.  Preoperative tobacco abstention x 60 days.    Assessment/Plan    1. Pre-op exam (Primary)    - ECG 12 lead (Clinic Performed)  - CBC; Future  - Basic metabolic panel; Future  - Protime-INR; Future    2. Mild intermittent reactive airway disease without complication ([facility])    - budesonide-formoteroL (Symbicort) 80-4.5 mcg/actuation inhaler; Inhale 2 puffs 2 times a day. Rinse mouth with water after use to reduce aftertaste and incidence of candidiasis. Do not swallow.  Dispense: 10.2 g; Refill:  5  -control reactive airway preoperatively with symbicort reassess after.    3. Angioedema, subsequent encounter    - has not had swelling of throat or airway seems to develop edema when pressure applied in dependent positions.

## 2024-11-13 NOTE — ASSESSMENT & PLAN NOTE
Start symbicort two puffs twice a day for reactive airway disease and preoperatively to prevent any respiratory complications. Longstanding smoker.

## 2024-11-14 ENCOUNTER — APPOINTMENT (OUTPATIENT)
Dept: PHYSICAL THERAPY | Facility: CLINIC | Age: 44
End: 2024-11-14
Payer: COMMERCIAL

## 2024-11-14 ENCOUNTER — HOSPITAL ENCOUNTER (OUTPATIENT)
Dept: PAIN MEDICINE | Facility: CLINIC | Age: 44
Discharge: HOME | End: 2024-11-14
Payer: COMMERCIAL

## 2024-11-14 VITALS
RESPIRATION RATE: 16 BRPM | TEMPERATURE: 95 F | OXYGEN SATURATION: 99 % | SYSTOLIC BLOOD PRESSURE: 99 MMHG | HEART RATE: 76 BPM | DIASTOLIC BLOOD PRESSURE: 54 MMHG

## 2024-11-14 DIAGNOSIS — M50.20 DISC DISPLACEMENT, CERVICAL: ICD-10-CM

## 2024-11-14 DIAGNOSIS — R29.898 UPPER EXTREMITY WEAKNESS: Primary | ICD-10-CM

## 2024-11-14 PROCEDURE — 62321 NJX INTERLAMINAR CRV/THRC: CPT | Performed by: PAIN MEDICINE

## 2024-11-14 PROCEDURE — 2500000004 HC RX 250 GENERAL PHARMACY W/ HCPCS (ALT 636 FOR OP/ED): Performed by: PAIN MEDICINE

## 2024-11-14 PROCEDURE — 2550000001 HC RX 255 CONTRASTS: Performed by: PAIN MEDICINE

## 2024-11-14 PROCEDURE — 99152 MOD SED SAME PHYS/QHP 5/>YRS: CPT | Performed by: PAIN MEDICINE

## 2024-11-14 RX ORDER — METHYLPREDNISOLONE ACETATE 80 MG/ML
INJECTION, SUSPENSION INTRA-ARTICULAR; INTRALESIONAL; INTRAMUSCULAR; SOFT TISSUE AS NEEDED
Status: COMPLETED | OUTPATIENT
Start: 2024-11-14 | End: 2024-11-14

## 2024-11-14 RX ORDER — LIDOCAINE HYDROCHLORIDE 10 MG/ML
INJECTION, SOLUTION EPIDURAL; INFILTRATION; INTRACAUDAL; PERINEURAL AS NEEDED
Status: COMPLETED | OUTPATIENT
Start: 2024-11-14 | End: 2024-11-14

## 2024-11-14 RX ORDER — MIDAZOLAM HYDROCHLORIDE 1 MG/ML
INJECTION, SOLUTION INTRAMUSCULAR; INTRAVENOUS AS NEEDED
Status: COMPLETED | OUTPATIENT
Start: 2024-11-14 | End: 2024-11-14

## 2024-11-14 ASSESSMENT — PAIN SCALES - GENERAL: PAINLEVEL_OUTOF10: 4

## 2024-11-14 ASSESSMENT — PAIN - FUNCTIONAL ASSESSMENT: PAIN_FUNCTIONAL_ASSESSMENT: 0-10

## 2024-11-14 ASSESSMENT — PAIN DESCRIPTION - DESCRIPTORS: DESCRIPTORS: ACHING;STABBING

## 2024-11-14 NOTE — DISCHARGE INSTRUCTIONS
Post-injection instructions:    Your pain may not be gone immediately after the procedure--it usually takes the steroid 3-5 days to start working.   It may take several weeks for the medicine to reach its' full effect.   Pay attention to how much pain relief (what percentage compared to before the procedure) you get and for how long it lasts.     Activity: Avoid strenuous activity for 24 hours. After that return to your normal activity level.     Bandages: Remove after 24 hours     Showering/Bathing: You may shower after bandage is removed     Follow up: CALL OFFICE IN 7 DAYS [phone] LEAVE MESSAGE ABOUT THE RELIEF THAT WAS OBTAINED      Call the doctor immediately: if you notice:     Excessive bleeding from procedure site (brisk bright red bleeding from the site or bleeding that soaks the bandages or does not stop)   Severe headache  Inability to walk, leg or arm weakness or numbness that is worse after the procedure   Uncontrolled pain   New urinary or fecal incontinence   Signs of infection: Fever above 101.5F, redness, swelling, pus or drainage from the site    Epidural Injection    Why is this procedure done?  With an epidural injection, the doctor injects drugs deep into the area around your spinal cord. This is different than epidural anesthesia that is used for surgery or when a woman has a baby. Your spine is a group of bones in your back that protect the nerves in your spinal cord. Problems with your spine can cause swollen nerves in the spinal cord. This swelling leads to pain and can limit movement. In an epidural injection, the doctor may give you a drug to help with swelling and pain.  You may have an epidural injection in different parts of your back, based on where your pain is. For pain in your head or arms, you may have a cervical epidural injection. If your pain is in your upper or middle back, you may get a thoracic epidural injection. For pain in your lower back or legs, you may get a  lumbar epidural injection.    What will the results be?  The treatment may:  Lower pain  Reduce swelling in the nerves  Improve movement  What happens before the procedure?  Your doctor will take your history. Talk to the doctor about:  All the drugs you are taking. Be sure to include all prescription and over-the-counter (OTC) drugs, and herbal supplements. Tell the doctor about any drug allergy. Bring a list of drugs you take with you.  If you have high blood sugar or diabetes. Your drugs may need to be changed.  Any bleeding problems. Be sure to tell your doctor if you are taking any drugs that may cause bleeding. Some of these are warfarin, rivaroxaban, apixaban, ticagrelor, clopidogrel, ketorolac, ibuprofen, naproxen, or aspirin. Certain vitamins and herbs, such as garlic and fish oil, may also add to the risk for bleeding. You may need to stop these drugs as well. Talk to your doctor about them.  Tell the doctor if you are pregnant.  You will not be allowed to drive right away after the procedure. Ask a family member or a friend to drive you home.  What happens during the procedure?  To help the doctor make sure the drugs are being injected in the right place, your doctor may do an x-ray of your spine. Other times your doctor may do a continuous x-ray during the procedure. This is a fluoroscopy. The doctor may also use a colored dye or a contrast dye to check where to inject the drug.  You may be given a drug to help you relax. You may be given a drug to make the area of the injection numb.  The doctor will clean the skin on your back or neck. This helps prevent infection.  The doctor will put a needle through the skin toward your spine. The drug will be injected into a space near the spine.  The needle will be taken out and a bandage will be placed over the injection site.  What happens after the procedure?  Staff will check on you to make sure you are doing well. They will tell you when you can go  home.  What care is needed at home?  Relax on the day of the injection.  Do not drive or run machines for at least 12 hours afterwards.  Apply ice to the injection site. Place an ice pack or a bag of frozen peas wrapped in a towel over the painful part. Never put ice right on the skin. Do not leave the ice on more than 10 to 15 minutes at a time.  It may take a few days before you will feel the effects of the injection.  What follow-up care is needed?  Your doctor may ask you to make visits to the office to check on your progress. Be sure to keep these visits. You may also need to see a physical therapist (PT). The PT will teach you exercises to help you get back your strength and motion. Ask your doctor when you can exercise.  What problems could happen?  Bleeding  Infection (rare)  Headache  Nerve injury  If you have diabetes, your blood sugar can go up after the injection. Check with your doctor if you need more treatment for this.  Last Reviewed Date

## 2024-11-15 ENCOUNTER — TREATMENT (OUTPATIENT)
Dept: PHYSICAL THERAPY | Facility: CLINIC | Age: 44
End: 2024-11-15
Payer: COMMERCIAL

## 2024-11-15 DIAGNOSIS — M48.02 CERVICAL SPINAL STENOSIS: ICD-10-CM

## 2024-11-15 DIAGNOSIS — M47.12 CERVICAL SPONDYLOSIS WITH MYELOPATHY: ICD-10-CM

## 2024-11-15 DIAGNOSIS — R29.898 UPPER EXTREMITY WEAKNESS: ICD-10-CM

## 2024-11-15 PROCEDURE — 97110 THERAPEUTIC EXERCISES: CPT | Mod: GP | Performed by: PHYSICAL THERAPIST

## 2024-11-15 PROCEDURE — 97140 MANUAL THERAPY 1/> REGIONS: CPT | Mod: GP | Performed by: PHYSICAL THERAPIST

## 2024-11-15 ASSESSMENT — PAIN SCALES - GENERAL: PAINLEVEL_OUTOF10: 6

## 2024-11-15 ASSESSMENT — PAIN - FUNCTIONAL ASSESSMENT: PAIN_FUNCTIONAL_ASSESSMENT: 0-10

## 2024-11-15 NOTE — PROGRESS NOTES
Patient Name: Caro Dewitt  MRN: [id number]  Today's Date: 11/18/2024  Time Calculation  Start Time: 1600  Stop Time: 1643  Time Calculation (min): 43 min     Diagnosis:  Problem List Items Addressed This Visit             ICD-10-CM    Cervical spinal stenosis M48.02    Cervical spondylosis with myelopathy M47.12    Upper extremity weakness - Primary R29.898       Insurance:  5/20 2024 // 0% coins, $300 / $900 ded ($300 / $600 met), $4000 / $54967 oop ($1508.91 / $2380.77 met), $25 copay, 60v per marsha yr (0v used as of 10/7/2024), no PA  [employer]  78797 - 18588 payable once every 180 days, 25190 payable once every 30 days  38945 Ultrasound not covered  Limited to 4 timed codes per visit  Referral auth# [id number] for evaluation.  10/7/24 thru 10/7/25.  M47.12, M48.02.  16196, 53543, 78964, 91903, 58138  POC: 10.16.24 - 1.14.25  Visit # 5    Precautions:  Precautions  Precautions Comment: no falls    Subjective:  Patient reports that she has been feeling pretty good.  She cancelled her surgery.  Reports that her neck feels pretty good - but she still feels the STR in the upper quarter.     Pain:  Pain Assessment: 0-10  0-10 (Numeric) Pain Score:  (2)   Location: UT/interscapular   Description: tightness and soreness    Objective:  Moderate STR - able to scapular lift on R and almost on L    Outcome Measure:      Treatment:  There ex 81061: 14/1  Prone scapular retraction x 5  Not today   Standing  scapular retraction/rows  x 10 RTB   Standing shoulder extension rows x 10 RTB   Supine neck retraction extension x 5  Supine horiz abduction RTB x 10  SA punches x 10      Manual therapy 09287: 29/2  STM, MFR, TrP!  Supine  SOC, UT, cervical distraction  In sidelying, supine  UT, levator, interscapular, pec minor, scapular lifting as able  Prone:  Thoracic paraspinals, UT  NOT today   G3-4 Thoracic PA mobs     HEP/Education:  Postural awareness and awareness of shoulder shrugging    Assessment:  Patient  tolerated today's session well.  She has had a significant reduction in pain since her epidural.  There ex was reintroduced this date. Patient was challenged but able to complete.  Patient demonstrates ongoing STR which have decreased to mod restrictions, upper quarter weakness, and functional limitations.  Patient cont to require additional skilled PT services for education, cues and instruction in there ex/HEP progression, manual therapy to address to ongoing joint restrictions and STR, and gait and nmreed to progress functional independence and decrease risk of falls in order to achieve the patient's and PT goal.  Patient demonstrates a working understanding of treatment plan and HEP requirements.    Plan:  Progress with functional strength as tolerated with respect to tissue healing. Manual therapy PRN to improve/maintain ROM, prevent adhesion, reduce pain. Discussed aquatic therapy and will plan to incorporate into her POC

## 2024-11-18 ENCOUNTER — TREATMENT (OUTPATIENT)
Dept: PHYSICAL THERAPY | Facility: CLINIC | Age: 44
End: 2024-11-18
Payer: COMMERCIAL

## 2024-11-18 ENCOUNTER — LAB (OUTPATIENT)
Dept: LAB | Facility: LAB | Age: 44
End: 2024-11-18
Payer: COMMERCIAL

## 2024-11-18 DIAGNOSIS — M54.41 CHRONIC BILATERAL LOW BACK PAIN WITH BILATERAL SCIATICA: ICD-10-CM

## 2024-11-18 DIAGNOSIS — F90.0 ADHD, PREDOMINANTLY INATTENTIVE TYPE: ICD-10-CM

## 2024-11-18 DIAGNOSIS — Z01.818 PRE-OP EXAM: ICD-10-CM

## 2024-11-18 DIAGNOSIS — F41.0 PANIC DISORDER: ICD-10-CM

## 2024-11-18 DIAGNOSIS — M54.42 CHRONIC BILATERAL LOW BACK PAIN WITH BILATERAL SCIATICA: ICD-10-CM

## 2024-11-18 DIAGNOSIS — M47.12 CERVICAL SPONDYLOSIS WITH MYELOPATHY: ICD-10-CM

## 2024-11-18 DIAGNOSIS — G89.29 CHRONIC BILATERAL LOW BACK PAIN WITH BILATERAL SCIATICA: ICD-10-CM

## 2024-11-18 DIAGNOSIS — M48.02 CERVICAL SPINAL STENOSIS: ICD-10-CM

## 2024-11-18 DIAGNOSIS — R29.898 UPPER EXTREMITY WEAKNESS: Primary | ICD-10-CM

## 2024-11-18 LAB
AMPHETAMINES UR QL SCN: ABNORMAL
ANION GAP SERPL CALC-SCNC: 10 MMOL/L (ref 10–20)
APPEARANCE UR: CLEAR
BACTERIA #/AREA URNS AUTO: ABNORMAL /HPF
BARBITURATES UR QL SCN: ABNORMAL
BASOPHILS # BLD AUTO: 0.05 X10*3/UL (ref 0–0.1)
BASOPHILS NFR BLD AUTO: 0.4 %
BENZODIAZ UR QL SCN: ABNORMAL
BILIRUB UR STRIP.AUTO-MCNC: NEGATIVE MG/DL
BUN SERPL-MCNC: 12 MG/DL (ref 6–23)
BZE UR QL SCN: ABNORMAL
CALCIUM SERPL-MCNC: 9.9 MG/DL (ref 8.6–10.3)
CANNABINOIDS UR QL SCN: ABNORMAL
CHLORIDE SERPL-SCNC: 103 MMOL/L (ref 98–107)
CO2 SERPL-SCNC: 30 MMOL/L (ref 21–32)
COLOR UR: YELLOW
CREAT SERPL-MCNC: 0.84 MG/DL (ref 0.5–1.05)
CRP SERPL-MCNC: 0.1 MG/DL
EGFRCR SERPLBLD CKD-EPI 2021: 88 ML/MIN/1.73M*2
EOSINOPHIL # BLD AUTO: 0.03 X10*3/UL (ref 0–0.7)
EOSINOPHIL NFR BLD AUTO: 0.2 %
ERYTHROCYTE [DISTWIDTH] IN BLOOD BY AUTOMATED COUNT: 13.2 % (ref 11.5–14.5)
ERYTHROCYTE [SEDIMENTATION RATE] IN BLOOD BY WESTERGREN METHOD: 4 MM/H (ref 0–20)
FENTANYL+NORFENTANYL UR QL SCN: ABNORMAL
GLUCOSE SERPL-MCNC: 83 MG/DL (ref 74–99)
GLUCOSE UR STRIP.AUTO-MCNC: NORMAL MG/DL
HCT VFR BLD AUTO: 40.1 % (ref 36–46)
HGB BLD-MCNC: 13.4 G/DL (ref 12–16)
IMM GRANULOCYTES # BLD AUTO: 0.05 X10*3/UL (ref 0–0.7)
IMM GRANULOCYTES NFR BLD AUTO: 0.4 % (ref 0–0.9)
KETONES UR STRIP.AUTO-MCNC: NEGATIVE MG/DL
LEUKOCYTE ESTERASE UR QL STRIP.AUTO: NEGATIVE
LYMPHOCYTES # BLD AUTO: 3.91 X10*3/UL (ref 1.2–4.8)
LYMPHOCYTES NFR BLD AUTO: 29.9 %
MCH RBC QN AUTO: 28.2 PG (ref 26–34)
MCHC RBC AUTO-ENTMCNC: 33.4 G/DL (ref 32–36)
MCV RBC AUTO: 84 FL (ref 80–100)
METHADONE UR QL SCN: ABNORMAL
MONOCYTES # BLD AUTO: 0.93 X10*3/UL (ref 0.1–1)
MONOCYTES NFR BLD AUTO: 7.1 %
MUCOUS THREADS #/AREA URNS AUTO: ABNORMAL /LPF
NEUTROPHILS # BLD AUTO: 8.09 X10*3/UL (ref 1.2–7.7)
NEUTROPHILS NFR BLD AUTO: 62 %
NITRITE UR QL STRIP.AUTO: NEGATIVE
NRBC BLD-RTO: 0 /100 WBCS (ref 0–0)
OPIATES UR QL SCN: ABNORMAL
OXYCODONE+OXYMORPHONE UR QL SCN: ABNORMAL
PCP UR QL SCN: ABNORMAL
PH UR STRIP.AUTO: 6 [PH]
PLATELET # BLD AUTO: 383 X10*3/UL (ref 150–450)
POTASSIUM SERPL-SCNC: 4 MMOL/L (ref 3.5–5.3)
PROT UR STRIP.AUTO-MCNC: NEGATIVE MG/DL
RBC # BLD AUTO: 4.76 X10*6/UL (ref 4–5.2)
RBC # UR STRIP.AUTO: ABNORMAL /UL
RBC #/AREA URNS AUTO: ABNORMAL /HPF
SODIUM SERPL-SCNC: 139 MMOL/L (ref 136–145)
SP GR UR STRIP.AUTO: 1.02
SQUAMOUS #/AREA URNS AUTO: ABNORMAL /HPF
UROBILINOGEN UR STRIP.AUTO-MCNC: NORMAL MG/DL
WBC # BLD AUTO: 13.1 X10*3/UL (ref 4.4–11.3)
WBC #/AREA URNS AUTO: ABNORMAL /HPF

## 2024-11-18 PROCEDURE — 85610 PROTHROMBIN TIME: CPT

## 2024-11-18 PROCEDURE — 80048 BASIC METABOLIC PNL TOTAL CA: CPT

## 2024-11-18 PROCEDURE — 36415 COLL VENOUS BLD VENIPUNCTURE: CPT

## 2024-11-18 PROCEDURE — 86140 C-REACTIVE PROTEIN: CPT

## 2024-11-18 PROCEDURE — 97110 THERAPEUTIC EXERCISES: CPT | Mod: GP | Performed by: PHYSICAL THERAPIST

## 2024-11-18 PROCEDURE — 85730 THROMBOPLASTIN TIME PARTIAL: CPT

## 2024-11-18 PROCEDURE — 86900 BLOOD TYPING SEROLOGIC ABO: CPT

## 2024-11-18 PROCEDURE — 85025 COMPLETE CBC W/AUTO DIFF WBC: CPT

## 2024-11-18 PROCEDURE — 85652 RBC SED RATE AUTOMATED: CPT

## 2024-11-18 PROCEDURE — 97140 MANUAL THERAPY 1/> REGIONS: CPT | Mod: GP | Performed by: PHYSICAL THERAPIST

## 2024-11-18 PROCEDURE — 86901 BLOOD TYPING SEROLOGIC RH(D): CPT

## 2024-11-18 PROCEDURE — 86850 RBC ANTIBODY SCREEN: CPT

## 2024-11-18 PROCEDURE — 81381 HLA I TYPING 1 ALLELE HR: CPT

## 2024-11-18 ASSESSMENT — PAIN - FUNCTIONAL ASSESSMENT: PAIN_FUNCTIONAL_ASSESSMENT: 0-10

## 2024-11-18 NOTE — PROGRESS NOTES
Patient Name: Caro Dewitt  MRN: [id number]  Today's Date: 11/21/2024        Diagnosis:  Problem List Items Addressed This Visit             ICD-10-CM    Upper extremity weakness - Primary R29.898       Insurance:  6/20 2024 // 0% coins, $300 / $900 ded ($300 / $600 met), $4000 / $14186 oop ($1508.91 / $2380.77 met), $25 copay, 60v per marsha yr (0v used as of 10/7/2024), no PA  [employer] Open [employer]  97350 - 86487 payable once every 180 days, 45024 payable once every 30 days  32352 Ultrasound not covered  Limited to 4 timed codes per visit  Referral auth# [id number] for evaluation.  10/7/24 thru 10/7/25.  M47.12, M48.02.  42260, 66234, 50882, 49986, 23434  POC: 10.16.24 - 1.14.25  Visit # 6    Precautions:       Subjective:  ***    Pain:      Location: ***   Description: ***   Aggravating Factors: {Aggravating Factors:44992}   Relieving Factors:  {Relieving Factors:12025}    Objective:  ***    Outcome Measure:  {PT Outcome Measures:65395}     Treatment:  There ex 57789: 14/1  Prone scapular retraction x 5  Not today   Standing  scapular retraction/rows  x 10 RTB   Standing shoulder extension rows x 10 RTB   Supine neck retraction extension x 5  Supine horiz abduction RTB x 10  SA punches x 10      Manual therapy 70153: 29/2  STM, MFR, TrP!  Supine  SOC, UT, cervical distraction  In sidelying, supine  UT, levator, interscapular, pec minor, scapular lifting as able  Prone:  Thoracic paraspinals, UT  NOT today   G3-4 Thoracic PA mobs  HEP/Education:  ***    Assessment:  Patient tolerated today's session ***  Patient demonstrates ***.  Patient cont to require additional skilled PT services for education, cues and instruction in there ex/HEP progression, manual therapy to address to ongoing joint and STR restriction, and gait and nmreed to progress functional independence and decrease risk of falls in order to achieve the patient's and PT goal.  Patient demonstrates a working understanding of treatment plan and HEP  requirements.    Plan:  Progress with functional strength as tolerated with respect to tissue healing. Manual therapy PRN to improve/maintain ROM, prevent adhesion, reduce pain.

## 2024-11-19 LAB
ABO GROUP (TYPE) IN BLOOD: NORMAL
ANTIBODY SCREEN: NORMAL
APTT PPP: 30 SECONDS (ref 27–38)
INR PPP: 1 (ref 0.9–1.1)
PROTHROMBIN TIME: 11.2 SECONDS (ref 9.8–12.8)
RH FACTOR (ANTIGEN D): NORMAL

## 2024-11-21 ENCOUNTER — APPOINTMENT (OUTPATIENT)
Dept: PHYSICAL THERAPY | Facility: CLINIC | Age: 44
End: 2024-11-21
Payer: COMMERCIAL

## 2024-11-21 DIAGNOSIS — R29.898 UPPER EXTREMITY WEAKNESS: Primary | ICD-10-CM

## 2024-11-21 LAB — HLAB27 TYPING: NEGATIVE

## 2024-11-22 LAB
AMPHET UR-MCNC: >5000 NG/ML
MDA UR-MCNC: <200 NG/ML
MDEA UR-MCNC: <200 NG/ML
MDMA UR-MCNC: <200 NG/ML
METHAMPHET UR-MCNC: <200 NG/ML
PHENTERMINE UR CFM-MCNC: <200 NG/ML

## 2024-12-02 ENCOUNTER — APPOINTMENT (OUTPATIENT)
Dept: BEHAVIORAL HEALTH | Facility: CLINIC | Age: 44
End: 2024-12-02
Payer: COMMERCIAL

## 2024-12-02 DIAGNOSIS — F41.1 GAD (GENERALIZED ANXIETY DISORDER): ICD-10-CM

## 2024-12-02 DIAGNOSIS — F90.0 ADHD, PREDOMINANTLY INATTENTIVE TYPE: ICD-10-CM

## 2024-12-02 DIAGNOSIS — F41.0 PANIC DISORDER: ICD-10-CM

## 2024-12-02 DIAGNOSIS — F40.00 AGORAPHOBIA: ICD-10-CM

## 2024-12-02 DIAGNOSIS — F33.40 RECURRENT MAJOR DEPRESSIVE DISORDER, IN REMISSION (CMS-HCC): ICD-10-CM

## 2024-12-02 PROCEDURE — 99214 OFFICE O/P EST MOD 30 MIN: CPT

## 2024-12-02 RX ORDER — CLONAZEPAM 0.5 MG/1
0.5 TABLET, ORALLY DISINTEGRATING ORAL 2 TIMES DAILY
Qty: 60 TABLET | Refills: 2 | Status: SHIPPED | OUTPATIENT
Start: 2024-12-02

## 2024-12-02 RX ORDER — HYDROXYZINE HYDROCHLORIDE 50 MG/1
50 TABLET, FILM COATED ORAL NIGHTLY
Qty: 90 TABLET | Refills: 1 | Status: SHIPPED | OUTPATIENT
Start: 2024-12-02

## 2024-12-02 RX ORDER — LISDEXAMFETAMINE DIMESYLATE 50 MG/1
50 CAPSULE ORAL EVERY MORNING
Qty: 30 CAPSULE | Refills: 0 | Status: SHIPPED | OUTPATIENT
Start: 2024-12-02 | End: 2025-01-01

## 2024-12-02 RX ORDER — LISDEXAMFETAMINE DIMESYLATE 50 MG/1
50 CAPSULE ORAL EVERY MORNING
Qty: 30 CAPSULE | Refills: 0 | Status: SHIPPED | OUTPATIENT
Start: 2025-01-02 | End: 2025-02-01

## 2024-12-02 NOTE — PROGRESS NOTES
"An interactive audio and video telecommunication system which permits real time communications between the patient (at the originating site) and provider (at the distant site) was utilized to provide this telehealth service.   Verbal consent was requested and obtained from Caro Dewitt on this date, 24 for a telehealth visit. Visit Locations: patient(Caro, home), provider(Norm Shah, virtual office)    HPI  Caro Dewitt is a 44 y.o. female patient with a CC ADHD, Anxiety, Depression, Panic Attack, Follow-up, and Med Management presenting to outpatient treatment for a scheduled psych outpatient psychiatric follow-up.   Pt identify self by name, , and address     2024  Reports feeling significant improvement on current regimen.  Hopelessness is improved since the last Trintellix dose adjustment.  Energy and motivation are also improved.  Continue to take Klonopin as prescribed as well as Vyvanse, Abilify, hydroxyzine as before.  Sleep and appetite are stable.  Denies panic attacks.  Anxiety and depression are mostly controlled.  Denies SI/HI/hallucinations/delusions/doni/hypomania.  Overall reports significant improvement in symptoms.  Denies mood swings.    Current S/Sx:  -Mood swings: stable  -Depressive mood: \"very anhedonic, zero pleasure out of anything. PHQ-9 (15)  -Fatigue/Energy: low  -Feeling hope/help/worthless: denies  -Sleep: sleep varies 5 - 12 hours with occasional naps.   -Motivation: low  -Appetite/Weight Changes: good appetite, no weight changes  -Psychosis: denies hallucinations/delusions  -SI/HI: Denies current suicidal intent/plan  -Guns/Weapons at home: denies     -Worry excessively: present, impactful  -Difficulty controlling worry: present, impactful  -Easily fatigued d/t worry: present, impactful  -Poor concentration d/t worry: present, impactful  -Sleep disturbance d/t worry: present, impactful  -Easy irritability d/t worry: present, " impactful  -Restless / feeling on edge d/t worry: denies  -CHANDRAKANT (14)     Panic attacks  Onset: ~20 yrs ago, duration: until the she can get out of the situation, frequency: about 5 times/week, associated s/sx: sweaty, feeling like passing out, SOB, palpitation, shaky, relieving factors: medication, sleep, precipitating factors: fear of leaving the house, driving, social, last one: this morning     HISTORY  PSYCH HISTORY  -Psych Hx:  CHANDRAKANT with panic attacks, depression, and ADHD, and agoraphobia.  -Psych Hospitalization Hx: denies  -Suicide Attempt Hx: denies  -Self-Harm/Violence Hx: denies  -Current psych meds: Celexa 40 mg (not helpful since starting 5 yrs ago), Vyvanse 40 mg daily, Klonopin 0.5 mg BID as needed for panic attacks (takes daily), Abilify 2 mg daily (2-3 yrs now), Hydroxyzine 50 mg at bedtime  -Psych Med Hx: Zoloft (ineffective), Wellbutrin (ineffective), Prozac (ineffective), Effexor (suicidal)     SUBSTANCE USE HISTORY  -Substance Use Hx: denies  -ETOH: rarely  -Tobacco: 1 ppd (since high school off/on)  -Caffeine: 2 - 4 coffee cups/day  -Substance Abuse Treatment Hx: denies     FAMILY HISTORY  -Family Psych Hx: both sides (mostly maternal side): anxiety, depression, ADHD etc  -Family Suicide Hx: mom, brother, sister - attempted several times  -Family Substance Abuse Hx: mom, brother, sister - alcoholism     SOCIAL HISTORY  -Upbringing: Grew up with both parents,  at a young age. Has brother/sister  -income: no issues  -Support system: good  -Trauma: emotional and sexual  -Education: bachelors  -Work: Sr.  of Research at [employer]  -Marital Status: Divorce proceedings with , in a relationship  -Children: 2 girls  -Living situation: lives with 2 daughters, and a dog  -: denies  -Legal: denies      MEDICAL HISTORY  -PCP: FERCHO Quezada-CNP  -TBI/head trauma/LOC/seizure hx: denies     REVIEW OF SYSTEMS  Review of Systems   Constitutional:          Pain for 15 yrs, MRI Brain, thoracic spine, Cervical spine   All other systems reviewed and are negative.       PHYSICAL EXAM  Physical Exam  Psychiatric:         Attention and Perception: Attention and perception normal.         Mood and Affect: Affect normal. Mood is anxious.         Speech: Speech normal.         Behavior: Behavior normal. Behavior is cooperative.         Thought Content: Thought content normal.         Cognition and Memory: Cognition and memory normal.         Judgment: Judgment normal.        IMPRESSION  ADHD, Anxiety, Depression, Panic Attack, Follow-up, and Med Management    Reports significant improvement with anxiety and depression on current regimen.  Attention/focus/concentration remain stable on current dose of Vyvanse.  Energy/motivation/depressed mood have improved since last Trintellix dose adjustment.  Denies panic attacks.  Denies mood swings.  Sleep and appetite are stable.  Denies hallucinations/delusions/doni/hypomania/SI.  Denies any noticeable side effects from medication.  Denies substance use.  Discussed to continue current regimen including Abilify, Vyvanse, Klonopin, hydroxyzine, and Trintellix.  Follow-up in 8 weeks.      SI/HI ASSESSMENT  -Risk Assessment: Caro Dewitt is currently a low acute risk of suicide and self-harm due to no past suicide attempt(s) and not currently endorsing thoughts of suicide.   -Suicidal Risk Factors: , history of trauma/abuse, chronic pain, current psychiatric illness, feelings of hopelessness, and panic attacks  -Protective Factors: strong coping skills, sense of responsibility towards family, positive family relationships, hopefulness/future orientation, marriage/partnership, and employment  -Plan to Reduce Risk: increase coping skills .     PLAN  Reviewed diagnostic impression including subjective and objective data and provided education about Panic attacks, MDD, CHANDRAKANT, ADHD, and Sleep disturbance, etiology, treatment  recommendations including medication, therapy, course of treatment and prognosis. Patient amenable to treatment plan.      Dx: ADHD, Anxiety, Depression, Panic Attack, Follow-up, and Med Management  INCREASE Trintellix 20 mg daily   CONTINUE Abilify 5 mg daily  CONTINUE Vyvanse 50 mg daily (enough refills until 2/1/2025)  CONTINUE Hydroxyzine 50 mg at bedtime  CONTINUE Klonopin 0.5 mg BID as needed for panic attacks/severe anxiety      Reviewed r/b/a, possible side effects of the medication. Client is aware about the benefit outweighs the risk.     Psychotherapy: tried several times but didn't have success    Labs reviewed     OARRS  I have personally reviewed the OARRS report for Caro DAHL AVELINA HighDewitt. I have considered the risks of abuse, dependence, addiction and diversion. Last filled Vyvanse 50 mg on 10/28/2024 (30 caps x 30 days), Klonopin 0.5 mg on 10/30/2024 (60 tabs x 30 days)    Last Urine Drug Screen  Date of Last Screen: 10/03/2023, UDS ordered    Controlled Substance Agreement:  Date of the Last Agreement: 5/7/2024 for Stimulants, 10/7/2024  for bzds  Reviewed Controlled Substance Agreement including but not limited to the benefits, risks, and alternatives to treatment with a Controlled Substance medication(s).      -Follow-up with this provider in 8 weeks.    - Follow up with physical health providers as scheduled  -Risks/benefits/assessment of medication interventions discussed with pt; pt agreeable to plan. Will continue to monitor for symptoms mgmt and SEs and adjust plan as needed.  -MI to increase coping skills/behavior regulation.  -Safety plan reviewed.  -Call  Psychiatry at [phone] with issues.  -For [facility] residents, [employer] is a 24/7 hotline you can call for assistance at [phone]) [phone]. Please call 911 or go to your closest Emergency Room if you feel worse. This includes thoughts of hurting yourself or anyone else, or having other troubles such as hearing voices, seeing  visions, or having new and scary thoughts about the people around you.

## 2024-12-17 ENCOUNTER — APPOINTMENT (OUTPATIENT)
Facility: CLINIC | Age: 44
End: 2024-12-17
Payer: COMMERCIAL

## 2024-12-18 ENCOUNTER — APPOINTMENT (OUTPATIENT)
Facility: CLINIC | Age: 44
End: 2024-12-18
Payer: COMMERCIAL

## 2024-12-19 ENCOUNTER — APPOINTMENT (OUTPATIENT)
Dept: NEUROSURGERY | Facility: CLINIC | Age: 44
End: 2024-12-19
Payer: COMMERCIAL

## 2024-12-20 ENCOUNTER — APPOINTMENT (OUTPATIENT)
Dept: PRIMARY CARE | Facility: CLINIC | Age: 44
End: 2024-12-20
Payer: COMMERCIAL

## 2024-12-23 ENCOUNTER — PATIENT OUTREACH (OUTPATIENT)
Dept: PRIMARY CARE | Facility: CLINIC | Age: 44
End: 2024-12-23
Payer: COMMERCIAL

## 2024-12-23 RX ORDER — AMOXICILLIN AND CLAVULANATE POTASSIUM 875; 125 MG/1; MG/1
875 TABLET, FILM COATED ORAL 2 TIMES DAILY
COMMUNITY
Start: 2024-12-22 | End: 2024-12-30

## 2024-12-23 RX ORDER — DOXYCYCLINE 100 MG/1
100 CAPSULE ORAL 2 TIMES DAILY
COMMUNITY
Start: 2024-12-22 | End: 2024-12-30

## 2024-12-23 NOTE — PROGRESS NOTES
Discharge Facility: [facility]  Discharge Diagnosis: cellulitis of left eye   Admission Date: 12/17/2024  Discharge Date: 12/22/2024    PCP Appointment Date: none  Specialist Appointment Date:   -plastic surgery 12/23/2024  -Follow-up with infectious disease     Hospital Encounter and Summary Linked: Yes  See discharge assessment below for further details    START taking these medications     amoxicillin-clavulanate potassium 875-125 mg per tablet  Commonly known as: AUGMENTIN  Take 1 tablet by mouth every 12 hours for 8 days.    doxycycline hyclate 100 mg capsule  Commonly known as: VIBRAMYCIN  Take 1 capsule (100 mg) by mouth two times a day for 8 days.     Engagement  Call Start Time: 1021 (12/23/2024 10:21 AM)    Medications  Medications reviewed with patient/caregiver?: Yes (12/23/2024 10:21 AM)  Is the patient having any side effects they believe may be caused by any medication additions or changes?: No (12/23/2024 10:21 AM)  Does the patient have all medications ordered at discharge?: Yes (12/23/2024 10:21 AM)  Care Management Interventions: No intervention needed (12/23/2024 10:21 AM)  Prescription Comments: new: augmentin and doxycycline (12/23/2024 10:21 AM)  Is the patient taking all medications as directed (includes completed medication regime)?: Yes (12/23/2024 10:21 AM)  Medication Comments: see med list (12/23/2024 10:21 AM)    Appointments  Does the patient have a primary care provider?: Yes (12/23/2024 10:21 AM)  Care Management Interventions: Advised patient to make appointment (12/23/2024 10:21 AM)  Has the patient kept scheduled appointments due by today?: Yes (12/23/2024 10:21 AM)  Care Management Interventions: Advised patient to keep appointment (12/23/2024 10:21 AM)    Self Management  What is the home health agency?: none (12/23/2024 10:21 AM)  Has home health visited the patient within 72 hours of discharge?: Not applicable (12/23/2024 10:21 AM)  What Durable Medical Equipment (DME) was  ordered?: n/a (12/23/2024 10:21 AM)    Patient Teaching  Does the patient have access to their discharge instructions?: Yes (12/23/2024 10:21 AM)  Care Management Interventions: Reviewed instructions with patient (12/23/2024 10:21 AM)  What is the patient's perception of their health status since discharge?: Improving (12/23/2024 10:21 AM)  Is the patient/caregiver able to teach back the hierarchy of who to call/visit for symptoms/problems? PCP, Specialist, Home Health nurse, Urgent Care, ED, 911: Yes (12/23/2024 10:21 AM)  Patient/Caregiver Education Comments: see wrap up (12/23/2024 10:21 AM)    Wrap Up  Wrap Up Additional Comments: CTS spoke with patient. She was admitted to [facility] on 12/17/2024 with cellulitis of the left eye. Discharged on 12/22/2024 with no home care needs. patient stated that she was doing better. Patient denies any fevers. Still has drains in- possibly getting out today 12/23/2024. Hardness is gone. Tenderness to touch is better in her cheek. Still has some under the eye. Reviewed medications. Patient stated no issues with obtaining new meds. Understands dishcarge instuctions. Need an appt with PCP- there were no available appts. No questions or concerns at this time. (12/23/2024 10:21 AM)  Call End Time: 1024 (12/23/2024 10:21 AM)

## 2025-01-03 ENCOUNTER — PATIENT OUTREACH (OUTPATIENT)
Dept: PRIMARY CARE | Facility: CLINIC | Age: 45
End: 2025-01-03

## 2025-01-03 ENCOUNTER — APPOINTMENT (OUTPATIENT)
Dept: PRIMARY CARE | Facility: CLINIC | Age: 45
End: 2025-01-03
Payer: COMMERCIAL

## 2025-01-03 VITALS
DIASTOLIC BLOOD PRESSURE: 62 MMHG | BODY MASS INDEX: 25.13 KG/M2 | RESPIRATION RATE: 20 BRPM | SYSTOLIC BLOOD PRESSURE: 95 MMHG | TEMPERATURE: 97.8 F | HEART RATE: 87 BPM | WEIGHT: 151 LBS

## 2025-01-03 DIAGNOSIS — T78.3XXD ANGIOEDEMA, SUBSEQUENT ENCOUNTER: ICD-10-CM

## 2025-01-03 DIAGNOSIS — E78.2 MIXED HYPERLIPIDEMIA: ICD-10-CM

## 2025-01-03 DIAGNOSIS — I95.9 HYPOTENSION, UNSPECIFIED HYPOTENSION TYPE: ICD-10-CM

## 2025-01-03 DIAGNOSIS — R42 DIZZINESS: ICD-10-CM

## 2025-01-03 DIAGNOSIS — I65.22 CAROTID ARTERY PLAQUE, LEFT: ICD-10-CM

## 2025-01-03 DIAGNOSIS — L03.213 PERIORBITAL CELLULITIS OF LEFT EYE: Primary | ICD-10-CM

## 2025-01-03 ASSESSMENT — ENCOUNTER SYMPTOMS
WOUND: 1
CONSTITUTIONAL NEGATIVE: 1
CARDIOVASCULAR NEGATIVE: 1
GASTROINTESTINAL NEGATIVE: 1
EYES NEGATIVE: 1
RESPIRATORY NEGATIVE: 1

## 2025-01-03 NOTE — PROGRESS NOTES
"Subjective   Patient ID: Caro Dewitt is a 44 y.o. female who presents for Hospital Follow-up (Abscess under left eye 12/28 and 12/30) and Hypotension (Patient has been having recent low Bp/Felt dizzy prior to leaving the house.).    Patient receieved fillers under her eyes. Everything was fine on the right side on the left side she had a lump under her skin thought it was filler she didn't think much of it. She was upset that the right eye didn't have the fullness.   A week later red spot started getting bigger, red and warm to the touch, eye was almost swollen shut- everything got really bad. When it started she called the place she got it done, she sent pictures and they wrote her a script for prednisone- then she went to urgent care they wrote a script for keflex and more prednisone didn't get either filled because [facility] was closed. Admitted her- did an incision and drainage had two wholes drained a bunch of pus she reports,  went home with drains that were removed on Monday. Had lumps under eyes still- thought it was filler but she went back to ED and they did another incision and drainage awake with local anesthesia and infection.   She is on doxycycline and augmentin. She needs to make a follow up with plastic surgery and infection disease. Which she plans to do.        Caro Dewitt is a 44 y.o. female presenting today for follow-up after being discharged from the hospital 4 days ago. The main problem requiring admission was cellulitis with need for incision and drainage. The discharge summary and/or Transitional Care Management documentation was reviewed. Medication reconciliation was performed as indicated via the \"Robert as Reviewed\" timestamp.     Caro Dewitt was contacted by Transitional Care Management services two days after her discharge. This encounter and supporting documentation was reviewed.    BP 95/62   Pulse 87   Temp 36.6 °C (97.8 °F)   Resp 20   Wt 68.5 kg (151 lb)   BMI " 25.13 kg/m²     The complexity of medical decision making for this patient's transitional care is moderate.    Assessment/Plan  Problem List Items Addressed This Visit           ICD-10-CM    Mixed hyperlipidemia E78.2     Carotid scan for stenosis has mild plaque on left side noted in CT scan   CAC 0      Hypotension I95.9     Hold spironalactone       Angioedema T78.3XXA     Hydroxyzine once a day in the morning for anxiety- not having panic   attack if she takes   It is preventing the angioedema and swelling and hives       Periorbital cellulitis of left eye - Primary L03.213     Plan:  Continue warm compress  Wound hygiene  Po antibiotics per ID  F/u with me in about a month.  They both agreed with plannedmamangement. I encouraged her to be seen by   the facility that did the filler treatment,  (L03.211) Cellulitis, face (primary encounter diagnosis)    Other Visit Diagnoses       Codes    Dizziness     R42    Carotid artery plaque, left     I65.22    Relevant Orders    Vascular US Carotid Artery Duplex Bilateral       Review of Systems   Constitutional: Negative.    HENT: Negative.     Eyes: Negative.    Respiratory: Negative.     Cardiovascular: Negative.    Gastrointestinal: Negative.    Skin:  Positive for rash and wound. Negative for pallor.     Objective   BP 95/62   Pulse 87   Temp 36.6 °C (97.8 °F)   Resp 20   Wt 68.5 kg (151 lb)   BMI 25.13 kg/m²     Physical Exam  HENT:      Head:        Comments: Tunneling wound with some surrounding erythema     Cardiovascular:      Rate and Rhythm: Normal rate and regular rhythm.      Pulses: Normal pulses.      Heart sounds: Normal heart sounds. No murmur heard.     No friction rub. No gallop.   Pulmonary:      Effort: Pulmonary effort is normal. No respiratory distress.      Breath sounds: Normal breath sounds. No stridor. No wheezing, rhonchi or rales.   Chest:      Chest wall: No tenderness.   Neurological:      Mental Status: She is alert.          Assessment/Plan   Problem List Items Addressed This Visit             ICD-10-CM    Mixed hyperlipidemia E78.2     Carotid scan for stenosis has mild plaque on left side noted in CT scan   CAC 0          Hypotension I95.9     Hold spironalactone            Angioedema T78.3XXA     Hydroxyzine once a day in the morning for anxiety- not having panic attack if she takes   It is preventing the angioedema and swelling and hives          Periorbital cellulitis of left eye - Primary L03.213     Plan:  Continue warm compress  Wound hygiene  Po antibiotics per ID  F/u with me in about a month.  They both agreed with plannedmamangement. I encouraged her to be seen by the facility that did the filler treatment,  (L03.211) Cellulitis, face (primary encounter diagnosis)            Other Visit Diagnoses         Codes    Dizziness     R42    Carotid artery plaque, left     I65.22    Relevant Orders    Vascular US Carotid Artery Duplex Bilateral

## 2025-01-03 NOTE — ASSESSMENT & PLAN NOTE
Plan:  Continue warm compress  Wound hygiene  Po antibiotics per ID  F/u with me in about a month.  They both agreed with plannedmamangement. I encouraged her to be seen by the facility that did the filler treatment,  (L03.211) Cellulitis, face (primary encounter diagnosis)

## 2025-01-03 NOTE — PATIENT INSTRUCTIONS
Follow up with infectious disease and plastic surgery referrals ordered  Send in script to [facility] for tirzepatide (has been off for a little while)

## 2025-01-03 NOTE — ASSESSMENT & PLAN NOTE
Hydroxyzine once a day in the morning for anxiety- not having panic attack if she takes   It is preventing the angioedema and swelling and hives

## 2025-01-03 NOTE — PROGRESS NOTES
Discharge Facility: [facility]  Discharge Diagnosis: cellulitis  Admission Date: 12/30/2024  Discharge Date: 1/1/2025    PCP Appointment Date: 1/3/2025  Specialist Appointment Date:    -ID 1/17/2025  - 1/27/2025    Hospital Encounter and Summary Linked: Yes/    Start Meds:   HYDROcodone-acetaminophen 5-325 mg per tablet  Commonly known as: NORCO  Take 1-2 tablets by mouth once daily as needed for pain (wound care) for up to 3 days.     TCM outreach deferred. Patient has an appointment scheduled within 2 business days of hospital discharge on 1/3/2025

## 2025-01-08 ENCOUNTER — PATIENT OUTREACH (OUTPATIENT)
Dept: PRIMARY CARE | Facility: CLINIC | Age: 45
End: 2025-01-08
Payer: COMMERCIAL

## 2025-01-23 PROBLEM — R22.0 FACIAL SWELLING: Status: ACTIVE | Noted: 2024-12-29

## 2025-01-23 PROBLEM — L70.0 ACNE VULGARIS: Status: ACTIVE | Noted: 2023-01-04

## 2025-01-23 PROBLEM — L02.91 ABSCESS: Status: ACTIVE | Noted: 2024-12-29

## 2025-01-23 PROBLEM — D72.829 LEUKOCYTOSIS: Status: ACTIVE | Noted: 2024-12-19

## 2025-01-23 PROBLEM — F32.9 REACTIVE DEPRESSION: Status: ACTIVE | Noted: 2023-01-04

## 2025-01-23 PROBLEM — L03.211 FACIAL CELLULITIS: Status: ACTIVE | Noted: 2024-12-17

## 2025-01-23 PROBLEM — L02.01 ABSCESS OF FACE: Status: ACTIVE | Noted: 2024-12-19

## 2025-01-23 PROBLEM — F17.200 NICOTINE DEPENDENCE: Status: ACTIVE | Noted: 2024-12-29

## 2025-01-23 PROBLEM — F41.9 ANXIETY: Status: ACTIVE | Noted: 2023-01-04

## 2025-01-23 RX ORDER — MELATONIN 3 MG
25 TABLET ORAL
COMMUNITY

## 2025-01-23 RX ORDER — HYDROCODONE BITARTRATE AND ACETAMINOPHEN 5; 325 MG/1; MG/1
TABLET ORAL
COMMUNITY
Start: 2025-01-01

## 2025-01-23 RX ORDER — AMOXICILLIN AND CLAVULANATE POTASSIUM 875; 125 MG/1; MG/1
1 TABLET, FILM COATED ORAL
COMMUNITY
Start: 2025-01-01

## 2025-01-23 RX ORDER — DOXYCYCLINE 100 MG/1
1 CAPSULE ORAL
COMMUNITY
Start: 2025-01-01

## 2025-01-23 RX ORDER — PREDNISONE 5 MG/1
TABLET ORAL
COMMUNITY
Start: 2024-12-12

## 2025-01-27 ENCOUNTER — APPOINTMENT (OUTPATIENT)
Dept: BEHAVIORAL HEALTH | Facility: CLINIC | Age: 45
End: 2025-01-27
Payer: COMMERCIAL

## 2025-01-27 DIAGNOSIS — F41.1 GAD (GENERALIZED ANXIETY DISORDER): ICD-10-CM

## 2025-01-27 DIAGNOSIS — F33.40 RECURRENT MAJOR DEPRESSIVE DISORDER, IN REMISSION (CMS-HCC): ICD-10-CM

## 2025-01-27 DIAGNOSIS — F90.0 ADHD, PREDOMINANTLY INATTENTIVE TYPE: ICD-10-CM

## 2025-01-27 DIAGNOSIS — F41.0 PANIC DISORDER: ICD-10-CM

## 2025-01-27 PROCEDURE — 99214 OFFICE O/P EST MOD 30 MIN: CPT

## 2025-01-27 RX ORDER — HYDROXYZINE HYDROCHLORIDE 50 MG/1
50 TABLET, FILM COATED ORAL NIGHTLY
Qty: 90 TABLET | Refills: 1 | Status: SHIPPED | OUTPATIENT
Start: 2025-01-27

## 2025-01-27 RX ORDER — ARIPIPRAZOLE 5 MG/1
5 TABLET ORAL DAILY
Qty: 90 TABLET | Refills: 1 | Status: SHIPPED | OUTPATIENT
Start: 2025-01-27 | End: 2025-07-26

## 2025-01-27 RX ORDER — LISDEXAMFETAMINE DIMESYLATE 50 MG/1
50 CAPSULE ORAL EVERY MORNING
Qty: 30 CAPSULE | Refills: 0 | Status: SHIPPED | OUTPATIENT
Start: 2025-02-14 | End: 2025-03-16

## 2025-01-27 RX ORDER — LISDEXAMFETAMINE DIMESYLATE 50 MG/1
50 CAPSULE ORAL EVERY MORNING
Qty: 30 CAPSULE | Refills: 0 | Status: SHIPPED | OUTPATIENT
Start: 2025-03-17 | End: 2025-04-16

## 2025-01-27 ASSESSMENT — ANXIETY QUESTIONNAIRES
6. BECOMING EASILY ANNOYED OR IRRITABLE: NOT AT ALL
7. FEELING AFRAID AS IF SOMETHING AWFUL MIGHT HAPPEN: NOT AT ALL
2. NOT BEING ABLE TO STOP OR CONTROL WORRYING: SEVERAL DAYS
4. TROUBLE RELAXING: SEVERAL DAYS
3. WORRYING TOO MUCH ABOUT DIFFERENT THINGS: SEVERAL DAYS
1. FEELING NERVOUS, ANXIOUS, OR ON EDGE: SEVERAL DAYS
IF YOU CHECKED OFF ANY PROBLEMS ON THIS QUESTIONNAIRE, HOW DIFFICULT HAVE THESE PROBLEMS MADE IT FOR YOU TO DO YOUR WORK, TAKE CARE OF THINGS AT HOME, OR GET ALONG WITH OTHER PEOPLE: NOT DIFFICULT AT ALL
5. BEING SO RESTLESS THAT IT IS HARD TO SIT STILL: NOT AT ALL
GAD7 TOTAL SCORE: 4

## 2025-01-27 ASSESSMENT — PATIENT HEALTH QUESTIONNAIRE - PHQ9
4. FEELING TIRED OR HAVING LITTLE ENERGY: SEVERAL DAYS
1. LITTLE INTEREST OR PLEASURE IN DOING THINGS: SEVERAL DAYS
3. TROUBLE FALLING OR STAYING ASLEEP: NOT AT ALL
8. MOVING OR SPEAKING SO SLOWLY THAT OTHER PEOPLE COULD HAVE NOTICED. OR THE OPPOSITE, BEING SO FIGETY OR RESTLESS THAT YOU HAVE BEEN MOVING AROUND A LOT MORE THAN USUAL: NOT AT ALL
9. THOUGHTS THAT YOU WOULD BE BETTER OFF DEAD, OR OF HURTING YOURSELF: NOT AT ALL
5. POOR APPETITE OR OVEREATING: SEVERAL DAYS
2. FEELING DOWN, DEPRESSED OR HOPELESS: MORE THAN HALF THE DAYS
7. TROUBLE CONCENTRATING ON THINGS, SUCH AS READING THE NEWSPAPER OR WATCHING TELEVISION: NOT AT ALL
SUM OF ALL RESPONSES TO PHQ QUESTIONS 1-9: 5
10. IF YOU CHECKED OFF ANY PROBLEMS, HOW DIFFICULT HAVE THESE PROBLEMS MADE IT FOR YOU TO DO YOUR WORK, TAKE CARE OF THINGS AT HOME, OR GET ALONG WITH OTHER PEOPLE: SOMEWHAT DIFFICULT
6. FEELING BAD ABOUT YOURSELF - OR THAT YOU ARE A FAILURE OR HAVE LET YOURSELF OR YOUR FAMILY DOWN: NOT AT ALL
SUM OF ALL RESPONSES TO PHQ9 QUESTIONS 1 & 2: 3

## 2025-01-27 NOTE — PROGRESS NOTES
An interactive audio and video telecommunication system which permits real time communications between the patient (at the originating site) and provider (at the distant site) was utilized to provide this telehealth service.   Verbal consent was requested and obtained from Caro Dewitt on this date, 25 for a telehealth visit. Visit Locations: patient(Caro, home), provider(Norm Shah, virtual office)    HPI  Caro Dewitt is a 44 y.o. female patient with a CC ADHD, Anxiety, Depression, Panic Attack, Follow-up, and Med Management presenting to outpatient treatment for a scheduled psych outpatient psychiatric follow-up.   Pt identify self by name, , and address     2025  Reports significant stressors since last visit relating to 2 hospitalizations for cellulitis on her left face following face fillers.  She now feels better but has a scar on her left face.  Otherwise, reports losing the remaining dose of her Trintellix, try to fill it early, but her insurance would not allow.  Otherwise attention/focus/concentration have remained relatively well manage on current dose of Vyvanse.  Mood also remains stable.  Sleep and appetite remain unchanged.  Denies panic attacks.  Reports stress and feeling overwhelmed, but denies anxiety or depression except related symptoms have become mildly unstable due to running out of her Trintellix for the last 2 weeks.  Denies SI/hallucinations/delusions/doni/hypomania.    Current S/Sx:  -Mood swings: Mostly stable  -Depressive mood: Improving. PHQ-9 (5)  -Fatigue/Energy: Below average  -Feeling hope/help/worthless: denies  -Sleep: sleeps well   -Motivation: Below average  -Appetite/Weight Changes: good appetite, no weight changes  -Psychosis: denies hallucinations/delusions  -SI/HI: Denies current suicidal intent/plan  -Guns/Weapons at home: denies     -Worry excessively: Manageable, but reports increased stress and feeling overwhelmed lately  -CHANDRAKANT  (4)     Panic attacks  Denies recent panic attacks     HISTORY  PSYCH HISTORY  -Psych Hx:  CHANDRAKANT with panic attacks, depression, and ADHD, and agoraphobia.  -Psych Hospitalization Hx: denies  -Suicide Attempt Hx: denies  -Self-Harm/Violence Hx: denies  -Current psych meds: Celexa 40 mg (not helpful since starting 5 yrs ago), Vyvanse 40 mg daily, Klonopin 0.5 mg BID as needed for panic attacks (takes daily), Abilify 2 mg daily (2-3 yrs now), Hydroxyzine 50 mg at bedtime  -Psych Med Hx: Zoloft (ineffective), Wellbutrin (ineffective), Prozac (ineffective), Effexor (suicidal)     SUBSTANCE USE HISTORY  -Substance Use Hx: denies  -ETOH: rarely  -Tobacco: 1 ppd (since high school off/on)  -Caffeine: 2 - 4 coffee cups/day  -Substance Abuse Treatment Hx: denies     FAMILY HISTORY  -Family Psych Hx: both sides (mostly maternal side): anxiety, depression, ADHD etc  -Family Suicide Hx: mom, brother, sister - attempted several times  -Family Substance Abuse Hx: mom, brother, sister - alcoholism     SOCIAL HISTORY  -Upbringing: Grew up with both parents,  at a young age. Has brother/sister  -income: no issues  -Support system: good  -Trauma: emotional and sexual  -Education: bachelors  -Work: Sr.  of Research at [employer]  -Marital Status: Divorce proceedings with , in a relationship  -Children: 2 girls  -Living situation: lives with 2 daughters, and a dog  -: denies  -Legal: denies      MEDICAL HISTORY  -PCP: FERCHO Quezada-CNP  -TBI/head trauma/LOC/seizure hx: denies     REVIEW OF SYSTEMS  Review of Systems   Constitutional:         Pain for 15 yrs, MRI Brain, thoracic spine, Cervical spine   HENT:  Voice change: trintellix.    All other systems reviewed and are negative.       PHYSICAL EXAM  Physical Exam  Psychiatric:         Attention and Perception: Attention and perception normal.         Mood and Affect: Affect normal. Mood is anxious.         Speech: Speech normal.          Behavior: Behavior normal. Behavior is cooperative.         Thought Content: Thought content normal.         Cognition and Memory: Cognition and memory normal.         Judgment: Judgment normal.        IMPRESSION  ADHD, Anxiety, Depression, Panic Attack, Follow-up, and Med Management    Reports recently feeling stressed and overwhelmed due to repeated cellulitis infection of the left face resulting from improper implantation of facial fillers.  Otherwise, anxiety and depression have been slightly unmanaged due to running out of her Trintellix and pharmacy refusing to fill early because of her insurance, not eligible to fill until 2/13/2025.  Otherwise denies panic attacks, denies mood swings.  Attention/focus/concentration remain stable on current dose of Vyvanse.  Sleep and appetite remain unchanged since last visit.  Denies hallucinations/delusions/doni/hypomania/SI.  Denies any noticeable side effects from medication.  Denies substance use.  Discussed to continue current regimen including Abilify, Vyvanse, Klonopin, hydroxyzine, and Trintellix.  Follow-up in 8 weeks.      SI/HI ASSESSMENT  -Risk Assessment: Caro Dewitt is currently a low acute risk of suicide and self-harm due to no past suicide attempt(s) and not currently endorsing thoughts of suicide.   -Suicidal Risk Factors: , history of trauma/abuse, chronic pain, current psychiatric illness, feelings of hopelessness, and panic attacks  -Protective Factors: strong coping skills, sense of responsibility towards family, positive family relationships, hopefulness/future orientation, marriage/partnership, and employment  -Plan to Reduce Risk: increase coping skills .     PLAN  Reviewed diagnostic impression including subjective and objective data and provided education about Panic attacks, MDD, CHANDRAKANT, ADHD, and Sleep disturbance, etiology, treatment recommendations including medication, therapy, course of treatment and prognosis. Patient  amenable to treatment plan.      Dx: ADHD, Anxiety, Depression, Panic Attack, Follow-up, and Med Management  CONTINUE Trintellix 20 mg daily   CONTINUE Abilify 5 mg daily  CONTINUE Vyvanse 50 mg daily (enough refills until 4/16/2025)  CONTINUE Hydroxyzine 50 mg at bedtime  CONTINUE Klonopin 0.5 mg BID as needed for panic attacks/severe anxiety      Reviewed r/b/a, possible side effects of the medication. Client is aware about the benefit outweighs the risk.     Psychotherapy: tried several times but didn't have success    Labs reviewed     OARRS  I have personally reviewed the OARRS report for Caro DAHL AVELINA HighDewitt. I have considered the risks of abuse, dependence, addiction and diversion. Last filled Vyvanse 50 mg on 01/14/2025 (30 caps x 30 days), Klonopin 0.5 mg on 01/19/2025  (50 tabs x 25 days)    Last Urine Drug Screen  Date of Last Screen: 10/03/2023, UDS ordered    Controlled Substance Agreement:  Date of the Last Agreement: 5/7/2024 for Stimulants, 10/7/2024  for bzds  Reviewed Controlled Substance Agreement including but not limited to the benefits, risks, and alternatives to treatment with a Controlled Substance medication(s).      -Follow-up with this provider in 8 weeks.    - Follow up with physical health providers as scheduled  -Risks/benefits/assessment of medication interventions discussed with pt; pt agreeable to plan. Will continue to monitor for symptoms mgmt and SEs and adjust plan as needed.  -MI to increase coping skills/behavior regulation.  -Safety plan reviewed.  -Call  Psychiatry at [phone] with issues.  -For [facility] residents, [employer] is a 24/7 hotline you can call for assistance at [phone]. Please call 911 or go to your closest Emergency Room if you feel worse. This includes thoughts of hurting yourself or anyone else, or having other troubles such as hearing voices, seeing visions, or having new and scary thoughts about the people around you.

## 2025-02-03 ENCOUNTER — APPOINTMENT (OUTPATIENT)
Dept: NEUROSURGERY | Facility: CLINIC | Age: 45
End: 2025-02-03
Payer: COMMERCIAL

## 2025-02-07 ENCOUNTER — PATIENT OUTREACH (OUTPATIENT)
Dept: PRIMARY CARE | Facility: CLINIC | Age: 45
End: 2025-02-07
Payer: COMMERCIAL

## 2025-02-25 ENCOUNTER — OFFICE VISIT (OUTPATIENT)
Dept: URGENT CARE | Age: 45
End: 2025-02-25
Payer: COMMERCIAL

## 2025-02-25 DIAGNOSIS — H57.12 ACUTE LEFT EYE PAIN: Primary | ICD-10-CM

## 2025-02-25 PROCEDURE — 99499 UNLISTED E&M SERVICE: CPT | Performed by: STUDENT IN AN ORGANIZED HEALTH CARE EDUCATION/TRAINING PROGRAM

## 2025-02-25 NOTE — PROGRESS NOTES
Unable to perform service. Concern for recurrent facial cellulitis in context of recent facial abscess which required surgery & hospitalization. Discussed possible in person UC evaluation first prior to potential ER.    [id number]

## 2025-02-27 ENCOUNTER — OFFICE VISIT (OUTPATIENT)
Dept: PRIMARY CARE | Facility: CLINIC | Age: 45
End: 2025-02-27
Payer: COMMERCIAL

## 2025-02-27 VITALS
HEIGHT: 65 IN | DIASTOLIC BLOOD PRESSURE: 64 MMHG | WEIGHT: 140 LBS | OXYGEN SATURATION: 98 % | TEMPERATURE: 98.1 F | BODY MASS INDEX: 23.32 KG/M2 | HEART RATE: 83 BPM | SYSTOLIC BLOOD PRESSURE: 96 MMHG | RESPIRATION RATE: 16 BRPM

## 2025-02-27 DIAGNOSIS — H01.006 BLEPHARITIS OF EYELID OF LEFT EYE, UNSPECIFIED EYELID, UNSPECIFIED TYPE: Primary | ICD-10-CM

## 2025-02-27 DIAGNOSIS — R40.0 DAYTIME SLEEPINESS: ICD-10-CM

## 2025-02-27 PROCEDURE — 3008F BODY MASS INDEX DOCD: CPT | Performed by: NURSE PRACTITIONER

## 2025-02-27 PROCEDURE — 99213 OFFICE O/P EST LOW 20 MIN: CPT | Performed by: NURSE PRACTITIONER

## 2025-02-27 RX ORDER — MINERAL OIL
180 ENEMA (ML) RECTAL DAILY
Qty: 30 TABLET | Refills: 5 | Status: SHIPPED | OUTPATIENT
Start: 2025-02-27 | End: 2026-02-27

## 2025-02-27 RX ORDER — ERYTHROMYCIN 5 MG/G
1 OINTMENT OPHTHALMIC EVERY 6 HOURS
Qty: 3.5 G | Refills: 0 | Status: SHIPPED | OUTPATIENT
Start: 2025-02-27 | End: 2025-03-06

## 2025-02-27 NOTE — PROGRESS NOTES
"Subjective   Caro Dewitt is a 44 y.o. female who presents for Abrasion and Foreign Body in Skin.  Patient presents for possible cellulitis on her face.  Was seen at [facility] on 02/25 for possible  left-sided periorbital cellulitis.  Symptoms started 02/24, Pain level 4, red, warm to touch.      Review of Systems   Eyes:  Eye redness: irritaiton and mild swelling.   Skin:  Positive for wound (small closed wound from previous abcess).   All other systems reviewed and are negative.      Objective   Physical Exam  Vitals reviewed.   HENT:      Head: Normocephalic.        Comments: Small healed indent from previous tunneled abscess that has healed.     Eyes:        Comments: Inflammation no drainage    Cardiovascular:      Rate and Rhythm: Normal rate and regular rhythm.      Pulses: Normal pulses.      Heart sounds: Normal heart sounds.   Pulmonary:      Effort: Pulmonary effort is normal.      Breath sounds: Normal breath sounds.   Neurological:      General: No focal deficit present.      Mental Status: She is alert and oriented to person, place, and time. Mental status is at baseline.       BP 96/64 (BP Location: Left arm, Patient Position: Sitting, BP Cuff Size: Large adult)   Pulse 83   Temp 36.7 °C (98.1 °F) (Temporal)   Resp 16   Ht 1.651 m (5' 5\")   Wt 63.5 kg (140 lb)   SpO2 98%   BMI 23.30 kg/m²       Assessment/Plan   Problem List Items Addressed This Visit    None  Visit Diagnoses       Blepharitis of eyelid of left eye, unspecified eyelid, unspecified type    -  Primary    Relevant Medications    erythromycin (Romycin) 5 mg/gram (0.5 %) ophthalmic ointment    fexofenadine (Allegra) 180 mg tablet    Daytime sleepiness        Relevant Orders    Referral to Neurology        Allergy for itching as eye is healing- try not to touch and introduce bacteria into the skin. Erythromycin ointment topically across erythematous area of eye. If fever chills worsening pain swelling please make me " aware.     Wants to pursue work up of excessive daytime sleepiness as well that is affecting her work and would appreciated neuro work up at this time.

## 2025-03-01 ASSESSMENT — ENCOUNTER SYMPTOMS: WOUND: 1

## 2025-03-10 ENCOUNTER — PATIENT OUTREACH (OUTPATIENT)
Dept: PRIMARY CARE | Facility: CLINIC | Age: 45
End: 2025-03-10
Payer: COMMERCIAL

## 2025-03-24 ENCOUNTER — APPOINTMENT (OUTPATIENT)
Dept: BEHAVIORAL HEALTH | Facility: CLINIC | Age: 45
End: 2025-03-24
Payer: COMMERCIAL

## 2025-03-24 DIAGNOSIS — F41.1 GAD (GENERALIZED ANXIETY DISORDER): ICD-10-CM

## 2025-03-24 DIAGNOSIS — F41.0 PANIC DISORDER: ICD-10-CM

## 2025-03-24 DIAGNOSIS — F33.40 RECURRENT MAJOR DEPRESSIVE DISORDER, IN REMISSION (CMS-HCC): ICD-10-CM

## 2025-03-24 DIAGNOSIS — F40.00 AGORAPHOBIA: ICD-10-CM

## 2025-03-24 DIAGNOSIS — F90.0 ADHD, PREDOMINANTLY INATTENTIVE TYPE: ICD-10-CM

## 2025-03-24 PROCEDURE — 99214 OFFICE O/P EST MOD 30 MIN: CPT

## 2025-03-24 RX ORDER — ARIPIPRAZOLE 5 MG/1
5 TABLET ORAL DAILY
Qty: 90 TABLET | Refills: 1 | Status: SHIPPED | OUTPATIENT
Start: 2025-03-24 | End: 2025-09-20

## 2025-03-24 RX ORDER — LISDEXAMFETAMINE DIMESYLATE 50 MG/1
50 CAPSULE ORAL EVERY MORNING
Qty: 30 CAPSULE | Refills: 0 | Status: SHIPPED | OUTPATIENT
Start: 2025-05-15 | End: 2025-06-14

## 2025-03-24 RX ORDER — LISDEXAMFETAMINE DIMESYLATE 50 MG/1
50 CAPSULE ORAL EVERY MORNING
Qty: 30 CAPSULE | Refills: 0 | Status: SHIPPED | OUTPATIENT
Start: 2025-04-17 | End: 2025-05-17

## 2025-03-24 RX ORDER — CLONAZEPAM 0.5 MG/1
0.5 TABLET, ORALLY DISINTEGRATING ORAL 2 TIMES DAILY
Qty: 60 TABLET | Refills: 2 | Status: SHIPPED | OUTPATIENT
Start: 2025-03-24 | End: 2025-06-22

## 2025-03-24 RX ORDER — LISDEXAMFETAMINE DIMESYLATE 50 MG/1
50 CAPSULE ORAL EVERY MORNING
Qty: 30 CAPSULE | Refills: 0 | Status: SHIPPED | OUTPATIENT
Start: 2025-06-14 | End: 2025-07-14

## 2025-03-24 ASSESSMENT — PATIENT HEALTH QUESTIONNAIRE - PHQ9
1. LITTLE INTEREST OR PLEASURE IN DOING THINGS: SEVERAL DAYS
2. FEELING DOWN, DEPRESSED OR HOPELESS: SEVERAL DAYS
SUM OF ALL RESPONSES TO PHQ9 QUESTIONS 1 & 2: 2
10. IF YOU CHECKED OFF ANY PROBLEMS, HOW DIFFICULT HAVE THESE PROBLEMS MADE IT FOR YOU TO DO YOUR WORK, TAKE CARE OF THINGS AT HOME, OR GET ALONG WITH OTHER PEOPLE: NOT DIFFICULT AT ALL

## 2025-03-24 NOTE — PROGRESS NOTES
Caro Dewitt is a 44 y.o. female patient presenting for a(an) virtual FUV  Visit location: patient (Caro Dewitt, home), [facility] [place] [facility]  Pt identify self by name, , and address    Chief Complaint   Patient presents with    ADHD    Anxiety    Depression    Panic Attack     With agoraphobia     Sleeping Problem    Follow-up    Med Management     HPI    2025  Reports mostly managed anxiety, depression, and mood on current doses of Trintellix 20 mg daily and Abilify 5 mg daily.  Also finds Vyvanse 50 mg stabilizing for attention/focus/concentration.  Continue to take Klonopin 0.5 mg twice daily as needed for agoraphobia and panic disorder.  Reports slight disturbance with sleep, has not been taking hydroxyzine regularly because she was recently prescribed Allegra 80 mg for allergies and tries to avoid drug-drug interaction.  Appetite is normal.  Denies any noticeable side effects from medication.  Denies substance use.  Denies SI/hallucinations/delusions/doni/hypomania.  Denies mood swings, panic attacks mostly managed on current dose of Klonopin.  Overall, reports feeling mostly stable.    Current S/Sx:  -Mood swings: Mostly stable  -Depressive mood: Improving. PHQ-9 (2)  -Fatigue/Energy: Average  -Feeling hope/help/worthless: denies  -Sleep: sleeps well   -Motivation: Average  -Appetite/Weight Changes: good appetite, no weight changes  -Psychosis: denies hallucinations/delusions  -SI/HI: Denies current suicidal intent/plan  -Guns/Weapons at home: denies     -Worry excessively: Manageable, but reports increased stress and feeling overwhelmed lately  -CHANDRAKANT (4)     Panic attacks  Denies recent panic attacks     HISTORY  PSYCH HISTORY  -Psych Hx:  CHANDRAKANT with panic attacks, depression, and ADHD, and agoraphobia.  -Psych Hospitalization Hx: denies  -Suicide Attempt Hx: denies  -Self-Harm/Violence Hx: denies  -Current psych meds: Celexa 40 mg (not helpful since  starting 5 yrs ago), Vyvanse 40 mg daily, Klonopin 0.5 mg BID as needed for panic attacks (takes daily), Abilify 2 mg daily (2-3 yrs now), Hydroxyzine 50 mg at bedtime  -Psych Med Hx: Zoloft (ineffective), Wellbutrin (ineffective), Prozac (ineffective), Effexor (suicidal)     SUBSTANCE USE HISTORY  -Substance Use Hx: denies  -ETOH: rarely  -Tobacco: 1 ppd (since high school off/on)  -Caffeine: 2 - 4 coffee cups/day  -Substance Abuse Treatment Hx: denies     FAMILY HISTORY  -Family Psych Hx: both sides (mostly maternal side): anxiety, depression, ADHD etc  -Family Suicide Hx: mom, brother, sister - attempted several times  -Family Substance Abuse Hx: mom, brother, sister - alcoholism     SOCIAL HISTORY  -Upbringing: Grew up with both parents,  at a young age. 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    All other systems reviewed and are negative.       PHYSICAL EXAM  Physical Exam  Psychiatric:         Attention and Perception: Attention and perception normal.         Mood and Affect: Mood and affect normal.         Speech: Speech normal.         Behavior: Behavior normal. Behavior is cooperative.         Thought Content: Thought content normal.         Cognition and Memory: Cognition and memory normal.         Judgment: Judgment normal.        IMPRESSION  FUV today via virtual. Patient reports managed anxiety, depression, and mood on current  doses of Trintellix 20 mg daily and Abilify 5 mg daily.  Notes Vyvanse 50 mg daily remains stabilizing for attention/focus/concentration.  Notes she continue to take Klonopin 0.5 mg twice daily as needed for agoraphobia and panic disorder.  Notes some sleep disturbance with sleep, no longer taking hydroxyzine regularly as before to prevent drug-drug interaction given recent prescription of Allegra 80 mg for allergies.  Appetite is normal.  Denies any noticeable side effects from medication.  Denies substance use.  Denies SI/hallucinations/delusions/doni/hypomania.  Denies mood swings, panic attacks mostly managed on current dose of Klonopin.  Discussed to continue on current regimen, will follow up with this provider in 12 weeks to continue monitoring, or sooner if needed.      Plan:     1. Reviewed diagnostic impression including subjective and objective data and provided education about Panic attacks, MDD, CHANDRAKANT, ADHD, and Sleep disturbance, etiology, treatment recommendations including medication, therapy, course of treatment and prognosis. Patient amenable to treatment plan.     2. Safety Assessment: History of no thoughts, but denies current thoughts of SI, plan/intent.  No h/o SA. RF include  , history of trauma/abuse, chronic pain, current psychiatric illness, feelings of hopelessness, and panic attacks. PF include strong coping skills, sense of responsibility towards family, positive family relationships, hopefulness/future orientation, marriage/partnership, and employment, engaged in care, future oriented, no guns.  Low imminent risk     3. @  Problem List Items Addressed This Visit       Panic disorder    Relevant Medications    clonazePAM (KlonoPIN) 0.5 mg disintegrating tablet    Recurrent major depressive disorder, in remission ([facility])    Relevant Medications    ARIPiprazole (Abilify) 5 mg tablet    ADHD, predominantly inattentive type    Relevant Medications    lisdexamfetamine (Vyvanse) 50  mg capsule (Start on 4/17/2025)    lisdexamfetamine (Vyvanse) 50 mg capsule (Start on 5/15/2025)    lisdexamfetamine (Vyvanse) 50 mg capsule (Start on 6/14/2025)    CHANDRAKANT (generalized anxiety disorder)    Relevant Medications    clonazePAM (KlonoPIN) 0.5 mg disintegrating tablet    Agoraphobia    Relevant Medications    clonazePAM (KlonoPIN) 0.5 mg disintegrating tablet      CONTINUE Trintellix 20 mg daily   CONTINUE Abilify 5 mg daily  CONTINUE Vyvanse 50 mg daily (enough refills until 7/14/2025)  CONTINUE Hydroxyzine 50 mg at bedtime as needed  CONTINUE Klonopin 0.5 mg BID as needed for panic attacks/severe anxiety (filled until 6/22/25)     Reviewed r/b/a, possible side effects of the medication. Client is aware about the benefit outweighs the risk.     4. INDIVIDUAL THERAPY:  tried several times but didn't have success     5. OARRS: I have personally reviewed the OARRS report for Caro Dewitt. I have considered the risks of abuse, dependence, addiction and diversion. Last filled Vyvanse 50 mg on 02/26/2025 (30 caps x 30 days), Klonopin 0.5 mg on 01/19/2025  (50 tabs x 25 days)     6. Labs reviewed    7. Last Urine Drug Screen  Date of Last Screen: 11/18/2024     8. Controlled Substance Agreement:  Date of the Last Agreement: 5/7/2024 for Stimulants, 10/7/2024  for bzds  Reviewed Controlled Substance Agreement including but not limited to the benefits, risks, and alternatives to treatment with a Controlled Substance medication(s).     9. Follow-up with this provider in 12 weeks.     10. Total time: 22 minutes via virtual     - Follow up with physical health providers as scheduled  - May follow up sooner if experiences worsening symptoms by calling  Psychiatry at [phone]  - Patient verbalized an understanding to call [employer] at [phone] ([facility]), 211, or 121/go to the nearest emergency room if experiences thoughts of harm to self or others.

## 2025-04-16 ENCOUNTER — OFFICE VISIT (OUTPATIENT)
Dept: PRIMARY CARE | Facility: CLINIC | Age: 45
End: 2025-04-16
Payer: COMMERCIAL

## 2025-04-16 VITALS
SYSTOLIC BLOOD PRESSURE: 103 MMHG | HEART RATE: 78 BPM | HEIGHT: 65 IN | WEIGHT: 145 LBS | DIASTOLIC BLOOD PRESSURE: 70 MMHG | TEMPERATURE: 97.9 F | BODY MASS INDEX: 24.16 KG/M2 | RESPIRATION RATE: 16 BRPM | OXYGEN SATURATION: 97 %

## 2025-04-16 DIAGNOSIS — E66.3 OVERWEIGHT: ICD-10-CM

## 2025-04-16 DIAGNOSIS — B96.89 BACTERIAL SINUSITIS: Primary | ICD-10-CM

## 2025-04-16 DIAGNOSIS — J32.9 BACTERIAL SINUSITIS: Primary | ICD-10-CM

## 2025-04-16 DIAGNOSIS — H65.03 BILATERAL ACUTE SEROUS OTITIS MEDIA, RECURRENCE NOT SPECIFIED: ICD-10-CM

## 2025-04-16 PROCEDURE — 3008F BODY MASS INDEX DOCD: CPT | Performed by: NURSE PRACTITIONER

## 2025-04-16 PROCEDURE — 99213 OFFICE O/P EST LOW 20 MIN: CPT | Performed by: NURSE PRACTITIONER

## 2025-04-16 RX ORDER — FLUTICASONE PROPIONATE 50 MCG
2 SPRAY, SUSPENSION (ML) NASAL DAILY
Qty: 16 G | Refills: 0 | Status: SHIPPED | OUTPATIENT
Start: 2025-04-16 | End: 2025-04-30

## 2025-04-16 RX ORDER — CLINDAMYCIN PHOSPHATE 10 MG/G
GEL TOPICAL
COMMUNITY
End: 2025-04-16 | Stop reason: ALTCHOICE

## 2025-04-16 RX ORDER — CITALOPRAM 40 MG/1
TABLET, FILM COATED ORAL
COMMUNITY
End: 2025-04-16 | Stop reason: ALTCHOICE

## 2025-04-16 RX ORDER — METFORMIN HYDROCHLORIDE 500 MG/1
TABLET, EXTENDED RELEASE ORAL
COMMUNITY
End: 2025-04-16 | Stop reason: ALTCHOICE

## 2025-04-16 RX ORDER — CEFDINIR 300 MG/1
300 CAPSULE ORAL 2 TIMES DAILY
Qty: 14 CAPSULE | Refills: 0 | Status: SHIPPED | OUTPATIENT
Start: 2025-04-16 | End: 2025-04-23

## 2025-04-16 ASSESSMENT — ENCOUNTER SYMPTOMS
HEADACHES: 0
SORE THROAT: 0
VOMITING: 0
NECK PAIN: 0

## 2025-04-16 NOTE — PROGRESS NOTES
Subjective   Caro Dewitt is a 44 y.o. female who presents for Sick Visit, Earache, and Ear Fullness.  Patient presents for a possible Ear Infection for both ear, left ear is bothering her more.  Symptoms started Friday last week.  No saline irrigation, no flonase, taking advil cold and sinus medication.   Wants to lose 10 more lbs and be around 135.       Earache   There is pain in both ears. This is a new problem. The current episode started in the past 7 days. The problem has been unchanged. Pertinent negatives include no headaches, hearing loss, neck pain, sore throat or vomiting. Treatments tried: Motrin.     Review of Systems   HENT:  Positive for ear pain. Negative for hearing loss and sore throat.    Gastrointestinal:  Negative for vomiting.   Musculoskeletal:  Negative for neck pain.   Neurological:  Negative for headaches.   All other systems reviewed and are negative.      Objective   Physical Exam  Vitals reviewed.   HENT:      Head: Normocephalic.      Right Ear: A middle ear effusion is present. Tympanic membrane is erythematous. Tympanic membrane is not bulging.      Left Ear: A middle ear effusion is present. Tympanic membrane is erythematous. Tympanic membrane is not bulging.   Cardiovascular:      Rate and Rhythm: Normal rate and regular rhythm.      Pulses: Normal pulses.      Heart sounds: Normal heart sounds. No murmur heard.     No friction rub. No gallop.   Pulmonary:      Effort: Pulmonary effort is normal. No respiratory distress.      Breath sounds: Normal breath sounds. No stridor. No wheezing, rhonchi or rales.   Chest:      Chest wall: No tenderness.   Neurological:      General: No focal deficit present.      Mental Status: She is alert and oriented to person, place, and time. Mental status is at baseline.   Psychiatric:         Mood and Affect: Mood normal.         Behavior: Behavior normal.         Thought Content: Thought content normal.         Judgment: Judgment normal.  "      /70 (BP Location: Left arm, Patient Position: Sitting, BP Cuff Size: Small adult)   Pulse 78   Temp 36.6 °C (97.9 °F) (Temporal)   Resp 16   Ht 1.651 m (5' 5\")   Wt 65.8 kg (145 lb)   SpO2 97%   BMI 24.13 kg/m²       Assessment/Plan   Problem List Items Addressed This Visit    None  Visit Diagnoses         Bacterial sinusitis    -  Primary    Relevant Medications    cefdinir (Omnicef) 300 mg capsule      Bilateral acute serous otitis media, recurrence not specified        Relevant Medications    fluticasone (Flonase) 50 mcg/actuation nasal spray      Overweight            Semaglutide for 10 more lbs of weight loss 0.5 mg   We discussed using nasal saline rinses and intranasal flonase, can consider claritin D as well. If no improvement after 72 hours then start omnicef.        "

## 2025-06-16 ENCOUNTER — APPOINTMENT (OUTPATIENT)
Dept: BEHAVIORAL HEALTH | Facility: CLINIC | Age: 45
End: 2025-06-16
Payer: COMMERCIAL

## 2025-06-16 DIAGNOSIS — F40.00 AGORAPHOBIA: ICD-10-CM

## 2025-06-16 DIAGNOSIS — F33.40 RECURRENT MAJOR DEPRESSIVE DISORDER, IN REMISSION: ICD-10-CM

## 2025-06-16 DIAGNOSIS — F41.1 GAD (GENERALIZED ANXIETY DISORDER): ICD-10-CM

## 2025-06-16 DIAGNOSIS — F90.0 ADHD, PREDOMINANTLY INATTENTIVE TYPE: ICD-10-CM

## 2025-06-16 PROCEDURE — 99214 OFFICE O/P EST MOD 30 MIN: CPT

## 2025-06-16 RX ORDER — LISDEXAMFETAMINE DIMESYLATE 50 MG/1
50 CAPSULE ORAL EVERY MORNING
Qty: 30 CAPSULE | Refills: 0 | Status: SHIPPED | OUTPATIENT
Start: 2025-07-21 | End: 2025-08-20

## 2025-06-16 RX ORDER — LISDEXAMFETAMINE DIMESYLATE 50 MG/1
50 CAPSULE ORAL EVERY MORNING
Qty: 30 CAPSULE | Refills: 0 | Status: SHIPPED | OUTPATIENT
Start: 2025-08-20 | End: 2025-09-19

## 2025-06-16 NOTE — PROGRESS NOTES
"Caro Dewitt is a 44 y.o. female patient presenting for a(an) virtual FUV  Visit location: patient (Caro Dewitt, home), [facility] [place] [facility]  Pt identify self by name, , and address    Chief Complaint   Patient presents with    ADHD    Anxiety    Panic Attack    Depression    Sleeping Problem    Follow-up    Med Management     HPI    2025  \"Things are going really good actually. Had a bad depression several weeks ago but feeling prety good right now for hte last couple of weeks.\"  \"I was off my Vyvanse for a couple of weeks, but once I picked the phone and call, picked up and resumed taking and feel much better.\"  \"Also was forgetting to take Trintellix but feeling better than I have felt for a really long time.\"   Appetite/sleep - normal  Endorse social anxiety but not having anxiety at home or panic attacks  Only taking Klonopin (depending on situation) when needed but continue to take Abilify, Vyvanse, and Trintellix as ordered  Attention/focus/concentration remains managed on current dose of Vyvanse 60 mg daily  Denies SI/HI/AVH/delusions/doni/hypomania  Denies substance use  Denies any noticeable side effects from medications    Current S/Sx:  -Mood swings: Mostly stable  -Depressive mood: Improving. PHQ-9 (2)  -Fatigue/Energy: Average  -Feeling hope/help/worthless: denies  -Sleep: sleeps well   -Motivation: Average  -Appetite/Weight Changes: good appetite, no weight changes  -Psychosis: denies hallucinations/delusions  -SI/HI: Denies current suicidal intent/plan  -Guns/Weapons at home: denies     -Worry excessively: Manageable, but reports increased stress and feeling overwhelmed lately  -CHANDRAKANT (4)     Panic attacks  Denies recent panic attacks     HISTORY  PSYCH HISTORY  -Psych Hx:  CHANDRAKANT with panic attacks, depression, and ADHD, and agoraphobia.  -Psych Hospitalization Hx: denies  -Suicide Attempt Hx: denies  -Self-Harm/Violence Hx: denies  -Current " psych meds: Celexa 40 mg (not helpful since starting 5 yrs ago), Vyvanse 40 mg daily, Klonopin 0.5 mg BID as needed for panic attacks (takes daily), Abilify 2 mg daily (2-3 yrs now), Hydroxyzine 50 mg at bedtime  -Psych Med Hx: Zoloft (ineffective), Wellbutrin (ineffective), Prozac (ineffective), Effexor (suicidal)     SUBSTANCE USE HISTORY  -Substance Use Hx: denies  -ETOH: rarely  -Tobacco: 1 ppd (since high school off/on)  -Caffeine: 2 - 4 coffee cups/day  -Substance Abuse Treatment Hx: denies     FAMILY HISTORY  -Family Psych Hx: both sides (mostly maternal side): anxiety, depression, ADHD etc  -Family Suicide Hx: mom, brother, sister - attempted several times  -Family Substance Abuse Hx: mom, brother, sister - alcoholism     SOCIAL HISTORY  -Upbringing: Grew up with both parents,  at a young age. Has brother/sister  -income: no issues  -Support system: good  -Trauma: emotional and sexual  -Education: bachelors  -Work: Sr.  of Research at [employer]  -Marital Status: Divorce proceedings with , in a relationship  -Children: 2 girls  -Living situation: lives with 2 daughters, and a dog  -: denies  -Legal: denies      MEDICAL HISTORY  -PCP: FERCHO Quezada-CNP  -TBI/head trauma/LOC/seizure hx: denies     REVIEW OF SYSTEMS  Review of Systems   Constitutional:         Pain for 15 yrs, MRI Brain, thoracic spine, Cervical spine   HENT:  Voice change: trintellix.    All other systems reviewed and are negative.       PHYSICAL EXAM  Physical Exam  Psychiatric:         Attention and Perception: Attention and perception normal.         Mood and Affect: Mood and affect normal.         Speech: Speech normal.         Behavior: Behavior normal. Behavior is cooperative.         Thought Content: Thought content normal.         Cognition and Memory: Cognition and memory normal.         Judgment: Judgment normal.        IMPRESSION  FUV today via virtual. Patient reports managed  anxiety, depression, and mood on current doses of Trintellix 20 mg daily and Abilify 5 mg daily.  Notes Vyvanse 50 mg daily remains stabilizing for attention/focus/concentration.  Reports intermittent depressive episodes resulting from sleeping her Vyvanse dose, lack of motivation to call her pharmacy fill her medication also skipping doses of Trintellix, but symptoms have since improved with taking medication consistently as prescribed.  Notes she continue to take Klonopin 0.5 mg twice daily as needed for agoraphobia and panic disorder.  Sleep/appetite-normal. Denies any noticeable side effects from medication.  Denies substance use.  Denies debilitating panic attacks or mood swings.  Denies SI/HI/AVH/delusions/doni/hypomania.  Discussed to continue on current regimen, will follow up with this provider in 10 weeks to continue monitoring, or sooner if needed.      Plan:     1. Reviewed diagnostic impression including subjective and objective data and provided education about Panic attacks, MDD, CHANDRAKANT, ADHD, and Sleep disturbance, etiology, treatment recommendations including medication, therapy, course of treatment and prognosis. Patient amenable to treatment plan.     2. Safety Assessment: History of no thoughts, but denies current thoughts of SI, plan/intent.  No h/o SA. RF include  , history of trauma/abuse, chronic pain, current psychiatric illness, feelings of hopelessness, and panic attacks. PF include strong coping skills, sense of responsibility towards family, positive family relationships, hopefulness/future orientation, marriage/partnership, and employment, engaged in care, future oriented, no guns.  Low imminent risk     3. @  Problem List Items Addressed This Visit       Recurrent major depressive disorder, in remission    Relevant Medications    vortioxetine (Trintellix) 20 mg tablet tablet    ADHD, predominantly inattentive type    Relevant Medications    lisdexamfetamine (Vyvanse) 50 mg  capsule (Start on 7/21/2025)    lisdexamfetamine (Vyvanse) 50 mg capsule (Start on 8/20/2025)    CHANDRAKANT (generalized anxiety disorder)    Relevant Medications    vortioxetine (Trintellix) 20 mg tablet tablet    Agoraphobia    Relevant Medications    vortioxetine (Trintellix) 20 mg tablet tablet     CONTINUE Trintellix 20 mg daily   CONTINUE Abilify 5 mg daily  CONTINUE Vyvanse 50 mg daily (enough refills until 9/19/2025)  CONTINUE Hydroxyzine 50 mg at bedtime as needed  CONTINUE Klonopin 0.5 mg BID as needed for panic attacks/severe anxiety (filled until 6/22/25)     Reviewed r/b/a, possible side effects of the medication. Client is aware about the benefit outweighs the risk.     4. INDIVIDUAL THERAPY:  tried several times but didn't have success     5. OARRS: I have personally reviewed the OARRS report for Caro Dewitt. I have considered the risks of abuse, dependence, addiction and diversion. Last filled Vyvanse 50 mg on 05/22/2025  (30 caps x 30 days), Klonopin 0.5 mg on 05/20/2025  (50 tabs x 25 days)     6. Labs reviewed    7. Last Urine Drug Screen  Date of Last Screen: 11/18/2024     8. Controlled Substance Agreement:  Date of the Last Agreement: 5/7/2024 for Stimulants, 10/7/2024  for bzds  Reviewed Controlled Substance Agreement including but not limited to the benefits, risks, and alternatives to treatment with a Controlled Substance medication(s).     9. Follow-up with this provider in 10 weeks.     10. Total time: 21 minutes via virtual     - Follow up with physical health providers as scheduled  - May follow up sooner if experiences worsening symptoms by calling  Psychiatry at [phone]  - Patient verbalized an understanding to call [facility] at [phone] ([facility]), 211, or 391/go to the nearest emergency room if experiences thoughts of harm to self or others.

## 2025-08-16 DIAGNOSIS — F41.1 GAD (GENERALIZED ANXIETY DISORDER): ICD-10-CM

## 2025-08-16 DIAGNOSIS — F41.0 PANIC DISORDER: ICD-10-CM

## 2025-08-18 RX ORDER — HYDROXYZINE HYDROCHLORIDE 50 MG/1
50 TABLET, FILM COATED ORAL NIGHTLY
Qty: 90 TABLET | Refills: 1 | Status: SHIPPED | OUTPATIENT
Start: 2025-08-18